# Patient Record
Sex: FEMALE | Race: WHITE | Employment: OTHER
[De-identification: names, ages, dates, MRNs, and addresses within clinical notes are randomized per-mention and may not be internally consistent; named-entity substitution may affect disease eponyms.]

---

## 2017-01-10 DIAGNOSIS — S63.509A: Primary | ICD-10-CM

## 2017-01-10 DIAGNOSIS — M12.539: ICD-10-CM

## 2017-01-12 ENCOUNTER — OFFICE VISIT (OUTPATIENT)
Dept: PAIN MANAGEMENT | Facility: CLINIC | Age: 67
End: 2017-01-12

## 2017-01-12 VITALS
RESPIRATION RATE: 16 BRPM | HEART RATE: 78 BPM | DIASTOLIC BLOOD PRESSURE: 71 MMHG | SYSTOLIC BLOOD PRESSURE: 145 MMHG | WEIGHT: 148 LBS | HEIGHT: 64 IN | BODY MASS INDEX: 25.27 KG/M2 | TEMPERATURE: 97 F

## 2017-01-12 DIAGNOSIS — M12.532 TRAUMATIC ARTHROPATHY OF LEFT WRIST: ICD-10-CM

## 2017-01-12 DIAGNOSIS — S63.502A SPRAIN OF LEFT FOREARM, INITIAL ENCOUNTER: ICD-10-CM

## 2017-01-12 DIAGNOSIS — S63.509A: ICD-10-CM

## 2017-01-12 DIAGNOSIS — S63.502A SPRAIN OF WRIST, LEFT, INITIAL ENCOUNTER: ICD-10-CM

## 2017-01-12 DIAGNOSIS — S63.501A SPRAIN OF WRIST, RIGHT, INITIAL ENCOUNTER: ICD-10-CM

## 2017-01-12 DIAGNOSIS — G89.29 OTHER CHRONIC PAIN: ICD-10-CM

## 2017-01-12 DIAGNOSIS — M12.539: ICD-10-CM

## 2017-01-12 PROCEDURE — 99214 OFFICE O/P EST MOD 30 MIN: CPT | Performed by: PHYSICAL MEDICINE & REHABILITATION

## 2017-01-12 RX ORDER — BUPRENORPHINE 7.5 UG/H
7.5 PATCH TRANSDERMAL
Qty: 4 PATCH | Refills: 2 | Status: SHIPPED | OUTPATIENT
Start: 2017-01-12 | End: 2017-03-30 | Stop reason: SDUPTHER

## 2017-03-30 ENCOUNTER — TELEPHONE (OUTPATIENT)
Dept: PAIN MANAGEMENT | Age: 67
End: 2017-03-30

## 2017-03-30 DIAGNOSIS — G89.29 OTHER CHRONIC PAIN: ICD-10-CM

## 2017-03-30 DIAGNOSIS — S63.501A SPRAIN OF WRIST, RIGHT, INITIAL ENCOUNTER: ICD-10-CM

## 2017-03-30 DIAGNOSIS — M12.532 TRAUMATIC ARTHROPATHY OF LEFT WRIST: ICD-10-CM

## 2017-03-30 DIAGNOSIS — S63.502A SPRAIN OF WRIST, LEFT, INITIAL ENCOUNTER: ICD-10-CM

## 2017-03-30 DIAGNOSIS — S63.502A SPRAIN OF LEFT FOREARM, INITIAL ENCOUNTER: ICD-10-CM

## 2017-03-30 DIAGNOSIS — M12.539: ICD-10-CM

## 2017-03-30 DIAGNOSIS — S63.509A: ICD-10-CM

## 2017-03-30 RX ORDER — BUPRENORPHINE 7.5 UG/H
7.5 PATCH TRANSDERMAL
Qty: 4 PATCH | Refills: 2 | Status: SHIPPED | OUTPATIENT
Start: 2017-03-30 | End: 2019-01-07

## 2017-05-18 ENCOUNTER — OFFICE VISIT (OUTPATIENT)
Dept: PAIN MANAGEMENT | Age: 67
End: 2017-05-18
Payer: COMMERCIAL

## 2017-05-18 VITALS
HEART RATE: 75 BPM | DIASTOLIC BLOOD PRESSURE: 57 MMHG | RESPIRATION RATE: 18 BRPM | WEIGHT: 156 LBS | HEIGHT: 63 IN | SYSTOLIC BLOOD PRESSURE: 144 MMHG | BODY MASS INDEX: 27.64 KG/M2 | TEMPERATURE: 98.2 F

## 2017-05-18 DIAGNOSIS — S63.501A SPRAIN OF WRIST, RIGHT, INITIAL ENCOUNTER: ICD-10-CM

## 2017-05-18 DIAGNOSIS — E78.49 OTHER HYPERLIPIDEMIA: ICD-10-CM

## 2017-05-18 DIAGNOSIS — I10 ESSENTIAL HYPERTENSION: ICD-10-CM

## 2017-05-18 DIAGNOSIS — S63.502A SPRAIN OF LEFT FOREARM, INITIAL ENCOUNTER: ICD-10-CM

## 2017-05-18 DIAGNOSIS — G89.29 OTHER CHRONIC PAIN: Primary | ICD-10-CM

## 2017-05-18 DIAGNOSIS — M12.532 TRAUMATIC ARTHROPATHY OF LEFT WRIST: ICD-10-CM

## 2017-05-18 DIAGNOSIS — M12.539: ICD-10-CM

## 2017-05-18 DIAGNOSIS — S63.509A: ICD-10-CM

## 2017-05-18 DIAGNOSIS — S63.502A SPRAIN OF WRIST, LEFT, INITIAL ENCOUNTER: ICD-10-CM

## 2017-05-18 PROBLEM — E78.5 HYPERLIPIDEMIA: Status: ACTIVE | Noted: 2017-05-18

## 2017-05-18 PROCEDURE — 99214 OFFICE O/P EST MOD 30 MIN: CPT | Performed by: PHYSICAL MEDICINE & REHABILITATION

## 2017-05-18 PROCEDURE — G8400 PT W/DXA NO RESULTS DOC: HCPCS | Performed by: PHYSICAL MEDICINE & REHABILITATION

## 2017-05-18 PROCEDURE — G8420 CALC BMI NORM PARAMETERS: HCPCS | Performed by: PHYSICAL MEDICINE & REHABILITATION

## 2017-05-18 PROCEDURE — 3017F COLORECTAL CA SCREEN DOC REV: CPT | Performed by: PHYSICAL MEDICINE & REHABILITATION

## 2017-05-18 PROCEDURE — 1036F TOBACCO NON-USER: CPT | Performed by: PHYSICAL MEDICINE & REHABILITATION

## 2017-05-18 PROCEDURE — 1123F ACP DISCUSS/DSCN MKR DOCD: CPT | Performed by: PHYSICAL MEDICINE & REHABILITATION

## 2017-05-18 PROCEDURE — G8427 DOCREV CUR MEDS BY ELIG CLIN: HCPCS | Performed by: PHYSICAL MEDICINE & REHABILITATION

## 2017-05-18 PROCEDURE — 4040F PNEUMOC VAC/ADMIN/RCVD: CPT | Performed by: PHYSICAL MEDICINE & REHABILITATION

## 2017-05-18 PROCEDURE — 1090F PRES/ABSN URINE INCON ASSESS: CPT | Performed by: PHYSICAL MEDICINE & REHABILITATION

## 2017-05-18 PROCEDURE — 3014F SCREEN MAMMO DOC REV: CPT | Performed by: PHYSICAL MEDICINE & REHABILITATION

## 2017-05-18 RX ORDER — BUPRENORPHINE 7.5 UG/H
1 PATCH TRANSDERMAL
Qty: 4 PATCH | Refills: 2 | Status: SHIPPED | OUTPATIENT
Start: 2017-05-18 | End: 2017-08-24 | Stop reason: SDUPTHER

## 2017-05-23 PROBLEM — E11.9 TYPE 2 DIABETES MELLITUS WITHOUT COMPLICATION, WITHOUT LONG-TERM CURRENT USE OF INSULIN (HCC): Status: ACTIVE | Noted: 2017-05-23

## 2017-08-24 ENCOUNTER — OFFICE VISIT (OUTPATIENT)
Dept: PAIN MANAGEMENT | Age: 67
End: 2017-08-24
Payer: MEDICARE

## 2017-08-24 VITALS
TEMPERATURE: 97.5 F | HEART RATE: 71 BPM | DIASTOLIC BLOOD PRESSURE: 70 MMHG | RESPIRATION RATE: 20 BRPM | SYSTOLIC BLOOD PRESSURE: 151 MMHG | WEIGHT: 156 LBS | HEIGHT: 64 IN | BODY MASS INDEX: 26.63 KG/M2

## 2017-08-24 DIAGNOSIS — M12.532 TRAUMATIC ARTHROPATHY OF LEFT WRIST: ICD-10-CM

## 2017-08-24 DIAGNOSIS — S63.502A SPRAIN OF WRIST, LEFT, INITIAL ENCOUNTER: ICD-10-CM

## 2017-08-24 DIAGNOSIS — S63.501A SPRAIN OF WRIST, RIGHT, INITIAL ENCOUNTER: ICD-10-CM

## 2017-08-24 DIAGNOSIS — S63.502A SPRAIN OF LEFT FOREARM, INITIAL ENCOUNTER: ICD-10-CM

## 2017-08-24 DIAGNOSIS — S63.509A: ICD-10-CM

## 2017-08-24 DIAGNOSIS — I10 ESSENTIAL HYPERTENSION: ICD-10-CM

## 2017-08-24 DIAGNOSIS — G89.29 OTHER CHRONIC PAIN: ICD-10-CM

## 2017-08-24 DIAGNOSIS — M12.539: ICD-10-CM

## 2017-08-24 PROCEDURE — 3014F SCREEN MAMMO DOC REV: CPT | Performed by: PHYSICAL MEDICINE & REHABILITATION

## 2017-08-24 PROCEDURE — G8400 PT W/DXA NO RESULTS DOC: HCPCS | Performed by: PHYSICAL MEDICINE & REHABILITATION

## 2017-08-24 PROCEDURE — G8419 CALC BMI OUT NRM PARAM NOF/U: HCPCS | Performed by: PHYSICAL MEDICINE & REHABILITATION

## 2017-08-24 PROCEDURE — 3017F COLORECTAL CA SCREEN DOC REV: CPT | Performed by: PHYSICAL MEDICINE & REHABILITATION

## 2017-08-24 PROCEDURE — 4040F PNEUMOC VAC/ADMIN/RCVD: CPT | Performed by: PHYSICAL MEDICINE & REHABILITATION

## 2017-08-24 PROCEDURE — 1123F ACP DISCUSS/DSCN MKR DOCD: CPT | Performed by: PHYSICAL MEDICINE & REHABILITATION

## 2017-08-24 PROCEDURE — 1036F TOBACCO NON-USER: CPT | Performed by: PHYSICAL MEDICINE & REHABILITATION

## 2017-08-24 PROCEDURE — 1090F PRES/ABSN URINE INCON ASSESS: CPT | Performed by: PHYSICAL MEDICINE & REHABILITATION

## 2017-08-24 PROCEDURE — 99214 OFFICE O/P EST MOD 30 MIN: CPT | Performed by: PHYSICAL MEDICINE & REHABILITATION

## 2017-08-24 PROCEDURE — G8427 DOCREV CUR MEDS BY ELIG CLIN: HCPCS | Performed by: PHYSICAL MEDICINE & REHABILITATION

## 2017-08-24 RX ORDER — BUPRENORPHINE 7.5 UG/H
1 PATCH TRANSDERMAL
Qty: 4 PATCH | Refills: 2 | Status: SHIPPED | OUTPATIENT
Start: 2017-08-24 | End: 2017-11-02 | Stop reason: SDUPTHER

## 2017-08-24 RX ORDER — BUPRENORPHINE 7.5 UG/H
1 PATCH TRANSDERMAL
Qty: 4 PATCH | Refills: 2 | Status: SHIPPED | OUTPATIENT
Start: 2017-08-24 | End: 2017-08-24 | Stop reason: SDUPTHER

## 2017-10-12 ENCOUNTER — HOSPITAL ENCOUNTER (OUTPATIENT)
Dept: WOMENS IMAGING | Age: 67
Discharge: HOME | End: 2017-10-12
Payer: MEDICARE

## 2017-10-12 DIAGNOSIS — Z12.31 ENCOUNTER FOR SCREENING MAMMOGRAM FOR BREAST CANCER: ICD-10-CM

## 2017-10-12 PROCEDURE — G0202 SCR MAMMO BI INCL CAD: HCPCS

## 2017-11-02 ENCOUNTER — OFFICE VISIT (OUTPATIENT)
Dept: PAIN MANAGEMENT | Age: 67
End: 2017-11-02
Payer: COMMERCIAL

## 2017-11-02 VITALS
BODY MASS INDEX: 27.46 KG/M2 | HEIGHT: 63 IN | WEIGHT: 155 LBS | SYSTOLIC BLOOD PRESSURE: 136 MMHG | RESPIRATION RATE: 18 BRPM | HEART RATE: 77 BPM | DIASTOLIC BLOOD PRESSURE: 68 MMHG | TEMPERATURE: 97 F

## 2017-11-02 DIAGNOSIS — G89.21 CHRONIC PAIN DUE TO TRAUMA: ICD-10-CM

## 2017-11-02 DIAGNOSIS — G89.29 OTHER CHRONIC PAIN: Primary | ICD-10-CM

## 2017-11-02 DIAGNOSIS — S63.502A SPRAIN OF WRIST, LEFT, INITIAL ENCOUNTER: ICD-10-CM

## 2017-11-02 DIAGNOSIS — M12.532 TRAUMATIC ARTHROPATHY OF LEFT WRIST: ICD-10-CM

## 2017-11-02 DIAGNOSIS — S63.502A SPRAIN OF LEFT FOREARM, INITIAL ENCOUNTER: ICD-10-CM

## 2017-11-02 DIAGNOSIS — S63.501A SPRAIN OF WRIST, RIGHT, INITIAL ENCOUNTER: ICD-10-CM

## 2017-11-02 DIAGNOSIS — I10 ESSENTIAL HYPERTENSION: ICD-10-CM

## 2017-11-02 PROCEDURE — 1036F TOBACCO NON-USER: CPT | Performed by: PHYSICAL MEDICINE & REHABILITATION

## 2017-11-02 PROCEDURE — 3014F SCREEN MAMMO DOC REV: CPT | Performed by: PHYSICAL MEDICINE & REHABILITATION

## 2017-11-02 PROCEDURE — 1090F PRES/ABSN URINE INCON ASSESS: CPT | Performed by: PHYSICAL MEDICINE & REHABILITATION

## 2017-11-02 PROCEDURE — G8400 PT W/DXA NO RESULTS DOC: HCPCS | Performed by: PHYSICAL MEDICINE & REHABILITATION

## 2017-11-02 PROCEDURE — 1123F ACP DISCUSS/DSCN MKR DOCD: CPT | Performed by: PHYSICAL MEDICINE & REHABILITATION

## 2017-11-02 PROCEDURE — 99214 OFFICE O/P EST MOD 30 MIN: CPT | Performed by: PHYSICAL MEDICINE & REHABILITATION

## 2017-11-02 PROCEDURE — 4040F PNEUMOC VAC/ADMIN/RCVD: CPT | Performed by: PHYSICAL MEDICINE & REHABILITATION

## 2017-11-02 PROCEDURE — G8417 CALC BMI ABV UP PARAM F/U: HCPCS | Performed by: PHYSICAL MEDICINE & REHABILITATION

## 2017-11-02 PROCEDURE — G8484 FLU IMMUNIZE NO ADMIN: HCPCS | Performed by: PHYSICAL MEDICINE & REHABILITATION

## 2017-11-02 PROCEDURE — 3017F COLORECTAL CA SCREEN DOC REV: CPT | Performed by: PHYSICAL MEDICINE & REHABILITATION

## 2017-11-02 PROCEDURE — G8427 DOCREV CUR MEDS BY ELIG CLIN: HCPCS | Performed by: PHYSICAL MEDICINE & REHABILITATION

## 2017-11-02 RX ORDER — BUPRENORPHINE 7.5 UG/H
1 PATCH TRANSDERMAL
Qty: 4 PATCH | Refills: 2 | Status: SHIPPED | OUTPATIENT
Start: 2017-11-16 | End: 2018-02-01 | Stop reason: SDUPTHER

## 2017-11-02 NOTE — PATIENT INSTRUCTIONS
SURVEY:    You may be receiving a survey from [employer] regarding your visit today. Please complete the survey to enable us to provide the highest quality of care to you and your family. If you cannot score us a very good on any question, please call the office to discuss how we could of made your experience a very good one. Thank you. Reviewed medication safety with pt today. 1.  Use one pharmacy and notify our office if a change in pharmacy occurs. 2.  Only one physician is to prescribe pain medication. 3.  Take medication as prescribed. Do NOT increase on your own. 4.  Do not take medication that does not belong to you. 5.  Lost or stolen medication will not be replaced. 6.  Refrain from using alcohol while taking narcotic pain medication. If alcohol is positive in urine a warning will be issued and second offense may    Result in discharge from our practice. Contact information MUST be kept current. Prior authorizations for medication can take 24 to 72 business hours. Refill requests should be made a week in advance if needed. It is the patient's responsibility to notify our office of refill requests NOT the pharmacy. Remember that refills should be addressed at your visit to prevent delays in your refill.

## 2017-11-02 NOTE — PROGRESS NOTES
Subjective:      Patient ID: Marielena Allen is a 69 y.o. female. Visit Information    Have you changed or started any medications since your last visit including any over-the-counter medicines, vitamins, or herbal medicines? no     Are you having any side effects from any of your medications? -  no    Any difficulty with constipation? No    Do you feel rested upon wakening? No      Have you stopped taking any of your medications or changed how you are taking your medication? Is so, why? -  no    What are you able to do with the current treatment plan that you were not able to do prior to treatment? Rest easier, decreases pain    Have you seen any other physician or provider since your last visit? Have you had any other diagnostic tests since your last visit? Have you been seen in the emergency room and/or had an admission to a hospital since we last saw you? No    Did you receive pain medication from another provider? No    Are you avoiding alcohol? Yes    Have you activated your [employer] account? If not, what are your barriers? Yes    HPI  Marielena Allen feels symptoms of left wrist pain are persistent. Patient has new complaint today of generalized aches. Pt fell on ice and has discomfort in left foot and leg. Patient feels home exercise program has helped them to function better with ADL's. Patient feels medicine has helped them function better with ADL's. Patient reports their average pain score is 6-7/10  with medication and at worst 9/10 with out pain medication. Patient filled out the Oswestry Low Back Pain Disability questionnaire and scored  9-10 / 48.  0 being not disabled and 48 being totally disabled. This score is with the current treatment plan. Patient feels like our current treatment program is helping over all. Marielena Allen states they do not have changes to there Family History since the last visit.    Marielena Allen states they do not have changes to there Social History since the last visit. Elgin Valladares is not currently working. Pt would like to discuss: Patient needs her refill      Review of Systems   Constitutional: Positive for fatigue. Negative for chills, activity change and appetite change. HENT: Positive for neck stiffness. Negative for headaches. Respiratory: Negative for apnea, and wheezing. Positive for shortness of breath with activity. Cardiovascular: Negative for leg swelling. Gastrointestinal: . Negative for diarrhea. Negative for constipation. Genitourinary: Negative for urgency, frequency and difficulty urinating. Musculoskeletal: Positive for arthralgias. Negative for joint swelling and gait problem. Positive for back stiffness. Skin: Negative for color change and pallor. Neurological: Negative for tremors. Sensitivity in tip of finger. Hematological: Negative for adenopathy. Does not bruise/bleed easily. Psychiatric/Behavioral:  Negative for confusion, disturbed wake/sleep cycle and decreased concentration. The patient is nervous/anxious. Positive for sleep disturbance. Objective:   Physical Exam   Constitutional:         /68   Pulse 77   Temp 97 °F (36.1 °C) (Tympanic)   Resp 18   Ht 5' 3\" (1.6 m)   Wt 155 lb (70.3 kg)   BMI 27.46 kg/m²     Constitutional:  General appearance well developed, well nourished. Eyes:  Sclera white. Mouth:  Tongue is pink, oral mucosa wet. Ears:  Normal color and no discharge. Nose:  No discharge, normal mucosa. Throat: Normal voice. Neck:  No tracheal deviation present. No thyromegaly. Skin:  Within normal limits on inspection. Lung: No audible wheezing or SOB. Neurological:  Patient is oriented to person, place, and time. Attention appears to be normal. Memory appears to be normal and language also appears to be normal. Cranial nerves appear to be grossly intact on the right and left without any gross abnormalities. Psychiatric:  Patient is alert and show normal insight.  Patient has normal affect. Musculoskeletal:  Gait appears to be normal with alternating arms and legs. No edema noted. No tenderness. Assessment:       ICD-10-CM ICD-9-CM    1. Other chronic pain G89.29 338.29 diclofenac sodium 1 % GEL      buprenorphine (BUTRANS) 7.5 MCG/HR PTWK   2. 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 Sprain of wrist, right, initial encounter S63.501A 842.00 diclofenac sodium 1 % GEL      buprenorphine (BUTRANS) 7.5 MCG/HR PTWK   5. Sprain of wrist, left, initial encounter S63.502A 842.00 diclofenac sodium 1 % GEL      buprenorphine (BUTRANS) 7.5 MCG/HR PTWK   6. Sprain of left forearm, initial encounter S63.502A 841.9 diclofenac sodium 1 % GEL      buprenorphine (BUTRANS) 7.5 MCG/HR PTWK   7. Essential hypertension I10 401.9 diclofenac sodium 1 % GEL      buprenorphine (BUTRANS) 7.5 MCG/HR PTWK         Plan:     In my professional opinion I believe that the Lucille Feliz should continue their current home exercise program which is both medically appropriate and necessary to reduce pain and increase functioning. In my professional opinion I believe that Lucille Feliz should continue their current medications as shown in the current medication section. Because it's reasonably related to the allowed conditions and are medically necessary and appropriate for treatment and control of associated symptoms.     I would also like Lucille Feliz to discontinue the following medications:    Discontinued Medications    No medications on file       I would like Lucille Feliz to use the following medications:    Modified Medications    Modified Medication Previous Medication    BUPRENORPHINE (BUTRANS) 7.5 MCG/HR PTWK buprenorphine (BUTRANS) 7.5 MCG/HR PTWK       Place 1 patch onto the skin every 7 days    Place 1 patch onto the skin every

## 2017-11-15 ENCOUNTER — HOSPITAL ENCOUNTER (OUTPATIENT)
Age: 67
Discharge: HOME | End: 2017-11-15
Payer: MEDICARE

## 2017-11-15 DIAGNOSIS — E11.9 TYPE 2 DIABETES MELLITUS WITHOUT COMPLICATION, WITHOUT LONG-TERM CURRENT USE OF INSULIN (HCC): ICD-10-CM

## 2017-11-15 LAB
ESTIMATED AVERAGE GLUCOSE: 131 MG/DL
HBA1C MFR BLD: 6.2 % (ref 4.8–5.9)

## 2017-11-15 PROCEDURE — 36415 COLL VENOUS BLD VENIPUNCTURE: CPT

## 2017-11-15 PROCEDURE — 83036 HEMOGLOBIN GLYCOSYLATED A1C: CPT

## 2018-01-30 DIAGNOSIS — M12.532 TRAUMATIC ARTHROPATHY OF LEFT WRIST: Primary | ICD-10-CM

## 2018-02-01 ENCOUNTER — OFFICE VISIT (OUTPATIENT)
Dept: PAIN MANAGEMENT | Age: 68
End: 2018-02-01
Payer: COMMERCIAL

## 2018-02-01 ENCOUNTER — HOSPITAL ENCOUNTER (OUTPATIENT)
Dept: LAB | Age: 68
Setting detail: SPECIMEN
Discharge: HOME | End: 2018-02-01
Payer: COMMERCIAL

## 2018-02-01 VITALS
TEMPERATURE: 98.2 F | DIASTOLIC BLOOD PRESSURE: 77 MMHG | HEART RATE: 75 BPM | BODY MASS INDEX: 27.28 KG/M2 | SYSTOLIC BLOOD PRESSURE: 136 MMHG | WEIGHT: 154 LBS

## 2018-02-01 DIAGNOSIS — S63.501A SPRAIN OF WRIST, RIGHT, INITIAL ENCOUNTER: ICD-10-CM

## 2018-02-01 DIAGNOSIS — S63.502A SPRAIN OF WRIST, LEFT, INITIAL ENCOUNTER: ICD-10-CM

## 2018-02-01 DIAGNOSIS — S63.502A SPRAIN OF LEFT FOREARM, INITIAL ENCOUNTER: ICD-10-CM

## 2018-02-01 DIAGNOSIS — M12.532 TRAUMATIC ARTHROPATHY OF LEFT WRIST: ICD-10-CM

## 2018-02-01 DIAGNOSIS — G89.29 OTHER CHRONIC PAIN: Primary | ICD-10-CM

## 2018-02-01 DIAGNOSIS — I10 ESSENTIAL HYPERTENSION: ICD-10-CM

## 2018-02-01 DIAGNOSIS — G89.21 CHRONIC PAIN DUE TO TRAUMA: ICD-10-CM

## 2018-02-01 PROCEDURE — 80307 DRUG TEST PRSMV CHEM ANLYZR: CPT

## 2018-02-01 PROCEDURE — 99214 OFFICE O/P EST MOD 30 MIN: CPT | Performed by: PHYSICAL MEDICINE & REHABILITATION

## 2018-02-01 RX ORDER — BUPRENORPHINE 7.5 UG/H
1 PATCH TRANSDERMAL
Qty: 4 PATCH | Refills: 2 | Status: SHIPPED | OUTPATIENT
Start: 2018-02-01 | End: 2019-01-07

## 2018-02-01 NOTE — PATIENT INSTRUCTIONS
Regarding your billing: This is a provider based clinic. Therefore you will receive 2 bills. One from the hospital billing service and one from [facility] - the physicians billing service. Reviewed medication safety with pt today. 1.  Use one pharmacy and notify our office if a change in pharmacy occurs. 2.  Only one physician is to prescribe pain medication. 3.  Take medication as prescribed. Do NOT increase on your own. 4.  Do not take medication that does not belong to you. 5.  Lost or stolen medication will not be replaced. 6.  Refrain from using alcohol while taking narcotic pain medication. If alcohol is positive in urine a warning will be issued and second offense may    Result in discharge from our practice. Contact information MUST be kept current. Prior authorizations for medication can take 24 to 72 business hours. Refill requests should be made a week in advance if needed. It is the patient's responsibility to notify our office of refill requests NOT the pharmacy. Remember that refills should be addressed at your visit to prevent delays in your refill. SURVEY:    You may be receiving a survey from [email or web address] regarding your visit today. Please complete the survey to enable us to provide the highest quality of care to you and your family. If you cannot score us a very good on any question, please call the office to discuss how we could of made your experience a very good one. Thank you.

## 2018-02-01 NOTE — PROGRESS NOTES
% GEL      buprenorphine (BUTRANS) 7.5 MCG/HR PTWK   2. 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 Sprain of wrist, right, initial encounter S63.501A 842.00 diclofenac sodium 1 % GEL      buprenorphine (BUTRANS) 7.5 MCG/HR PTWK   5. Sprain of wrist, left, initial encounter S63.502A 842.00 diclofenac sodium 1 % GEL      buprenorphine (BUTRANS) 7.5 MCG/HR PTWK   6. Sprain of left forearm, initial encounter S63.502A 841.9 diclofenac sodium 1 % GEL      buprenorphine (BUTRANS) 7.5 MCG/HR PTWK   7. Essential hypertension I10 401.9 diclofenac sodium 1 % GEL      buprenorphine (BUTRANS) 7.5 MCG/HR PTWK         Plan:     In my professional opinion I believe that the Jennifer Mew should continue their current home exercise program which is both medically appropriate and necessary to reduce pain and increase functioning. In my professional opinion I believe that sis Mew should continue their current medications as shown in the current medication section. Because it's reasonably related to the allowed conditions and are medically necessary and appropriate for treatment and control of associated symptoms. [facility] also [facility] following medications:    Discontinued Medications    No medications on file       I would like Jennifer Mew to use the following medications:    Modified Medications    Modified Medication Previous Medication    BUPRENORPHINE (BUTRANS) 7.5 MCG/HR PTWK buprenorphine (BUTRANS) 7.5 MCG/HR PTWK       Place 1 patch onto the skin every 7 days for 84 days.     Place 1 patch onto the skin every 7 days    DICLOFENAC SODIUM 1 % GEL diclofenac sodium 1 % GEL       Apply 4 g topically 4 times daily as needed (inflammation)    Apply 4 g topically 4 times daily as needed (inflammation)     I would like Jennifer Mew to use the following medications:    New Prescriptions    No medications on file     I discussed with the Saad Del Real about the management of controlled medications being prescribed in the current medication section. I instructed the patient on random urine tox screens, pill counts and OARRS reporting. Also instructed on letting us know if any other physicians are writing medications for them. As well as use of only one pharmacy. Controlled Substances Monitoring:     Attestation: The Prescription Monitoring Report for this patient was reviewed today. Faraz Cunningham MD)  Documentation: No signs of potential drug abuse or diversion identified. Faraz Cunningham MD)  Chronic Pain: Treatment objectives documented - patient is progressing appropriately. , Functional status reviewed - continues with improved or maintaining ADL's., Reviewed the patient's functional status and documentation, including the 4A's of chronic pain treatment. Faraz Cunningham MD)  Medication Contracts: Existing medication contract. Faraz Cunningham MD)      I discussed with the patient side effects and danger of prescribed medicine. I will follow up with the as noted on the discharge sheet. I Dr Barbara Becerra will be retiring from [facility] in March of 2018 and Saad Del Real  will need to follow up with you. They have been doing well on the current medication as seen above. I hope you will be able to take care of there pain management needs.

## 2018-02-06 ENCOUNTER — HOSPITAL ENCOUNTER (EMERGENCY)
Age: 68
Discharge: HOME | End: 2018-02-06
Attending: EMERGENCY MEDICINE
Payer: MEDICARE

## 2018-02-06 VITALS
HEIGHT: 63 IN | BODY MASS INDEX: 27.46 KG/M2 | HEART RATE: 72 BPM | TEMPERATURE: 95.9 F | OXYGEN SATURATION: 98 % | SYSTOLIC BLOOD PRESSURE: 120 MMHG | DIASTOLIC BLOOD PRESSURE: 64 MMHG | RESPIRATION RATE: 16 BRPM | WEIGHT: 155 LBS

## 2018-02-06 DIAGNOSIS — R42 VERTIGO: Primary | ICD-10-CM

## 2018-02-06 LAB
6-ACETYLMORPHINE, UR: NOT DETECTED
7-AMINOCLONAZEPAM, URINE: NOT DETECTED
ABSOLUTE EOS #: 0.1 K/UL (ref 0–0.4)
ABSOLUTE IMMATURE GRANULOCYTE: ABNORMAL K/UL (ref 0–0.3)
ABSOLUTE LYMPH #: 2.2 K/UL (ref 1–4.8)
ABSOLUTE MONO #: 0.7 K/UL (ref 0.2–0.8)
ALPHA-OH-ALPRAZ, URINE: NOT DETECTED
ALPRAZOLAM, URINE: NOT DETECTED
AMPHETAMINES, URINE: NOT DETECTED
ANION GAP SERPL CALCULATED.3IONS-SCNC: 12 MMOL/L (ref 9–17)
BARBITURATES, URINE: NOT DETECTED
BASOPHILS # BLD: 1 % (ref 0–2)
BASOPHILS ABSOLUTE: 0 K/UL (ref 0–0.2)
BENZOYLECGONINE, UR: NOT DETECTED
BUN BLDV-MCNC: 20 MG/DL (ref 8–23)
BUN/CREAT BLD: 24 (ref 9–20)
BUPRENORPHINE URINE: NOT DETECTED
CALCIUM SERPL-MCNC: 8.7 MG/DL (ref 8.6–10.4)
CARISOPRODOL, UR: NOT DETECTED
CHLORIDE BLD-SCNC: 99 MMOL/L (ref 98–107)
CLONAZEPAM, URINE: NOT DETECTED
CO2: 30 MMOL/L (ref 20–31)
CODEINE, URINE: NOT DETECTED
CREAT SERPL-MCNC: 0.83 MG/DL (ref 0.5–0.9)
CREATININE URINE: 98.1 MG/DL (ref 20–400)
DIAZEPAM, URINE: NOT DETECTED
DIFFERENTIAL TYPE: ABNORMAL
DIRECT EXAM: NORMAL
DRUGS EXPECTED, UR: NORMAL
EER HI RES INTERP UR: NORMAL
EKG ATRIAL RATE: 67 BPM
EKG P AXIS: 19 DEGREES
EKG P-R INTERVAL: 186 MS
EKG Q-T INTERVAL: 422 MS
EKG QRS DURATION: 100 MS
EKG QTC CALCULATION (BAZETT): 445 MS
EKG R AXIS: -34 DEGREES
EKG T AXIS: 24 DEGREES
EKG VENTRICULAR RATE: 67 BPM
EOSINOPHILS RELATIVE PERCENT: 2 % (ref 1–4)
ETHYL GLUCURONIDE UR: NOT DETECTED
FENTANYL URINE: NOT DETECTED
GFR AFRICAN AMERICAN: >60 ML/MIN
GFR NON-AFRICAN AMERICAN: >60 ML/MIN
GFR SERPL CREATININE-BSD FRML MDRD: ABNORMAL ML/MIN/{1.73_M2}
GFR SERPL CREATININE-BSD FRML MDRD: ABNORMAL ML/MIN/{1.73_M2}
GLUCOSE BLD-MCNC: 146 MG/DL (ref 70–99)
HCT VFR BLD CALC: 37.4 % (ref 36–46)
HEMOGLOBIN: 12.9 G/DL (ref 12–16)
HYDROCODONE, URINE: NOT DETECTED
HYDROMORPHONE, URINE: NOT DETECTED
IMMATURE GRANULOCYTES: ABNORMAL %
LORAZEPAM, URINE: NOT DETECTED
LYMPHOCYTES # BLD: 38 % (ref 24–44)
Lab: NORMAL
MARIJUANA METAB, UR: NOT DETECTED
MCH RBC QN AUTO: 31.1 PG (ref 26–34)
MCHC RBC AUTO-ENTMCNC: 34.4 G/DL (ref 31–37)
MCV RBC AUTO: 90.3 FL (ref 80–100)
MDA, UR: NOT DETECTED
MDEA, EVE, UR: NOT DETECTED
MDMA URINE: NOT DETECTED
MEPERIDINE METAB, UR: NOT DETECTED
METHADONE, URINE: NOT DETECTED
METHAMPHETAMINE, URINE: NOT DETECTED
METHYLPHENIDATE: NOT DETECTED
MIDAZOLAM, URINE: NOT DETECTED
MONOCYTES # BLD: 12 % (ref 1–7)
MORPHINE URINE: NOT DETECTED
NORBUPRENORPHINE, URINE: NOT DETECTED
NORDIAZEPAM, URINE: NOT DETECTED
NORFENTANYL, URINE: NOT DETECTED
NORHYDROCODONE, URINE: NOT DETECTED
NOROXYCODONE, URINE: NOT DETECTED
NOROXYMORPHONE, URINE: NOT DETECTED
NRBC AUTOMATED: ABNORMAL PER 100 WBC
OXAZEPAM, URINE: NOT DETECTED
OXYCODONE URINE: NOT DETECTED
OXYMORPHONE, URINE: NOT DETECTED
PAIN MANAGEMENT DRUG PANEL INTERP, URINE: NORMAL
PAIN MGT DRUG PANEL, HI RES, UR: NORMAL
PCP,URINE: NOT DETECTED
PDW BLD-RTO: 12.2 % (ref 12.1–15.2)
PHENTERMINE, UR: NOT DETECTED
PLATELET # BLD: 273 K/UL (ref 140–450)
PLATELET ESTIMATE: ABNORMAL
PMV BLD AUTO: 8.1 FL (ref 6–12)
POTASSIUM SERPL-SCNC: 3.2 MMOL/L (ref 3.7–5.3)
PROPOXYPHENE, URINE: NOT DETECTED
RBC # BLD: 4.15 M/UL (ref 4–5.2)
RBC # BLD: ABNORMAL 10*6/UL
SEG NEUTROPHILS: 47 % (ref 36–66)
SEGMENTED NEUTROPHILS ABSOLUTE COUNT: 2.9 K/UL (ref 1.8–7.7)
SODIUM BLD-SCNC: 141 MMOL/L (ref 135–144)
SPECIMEN DESCRIPTION: NORMAL
STATUS: NORMAL
TAPENTADOL, URINE: NOT DETECTED
TAPENTADOL-O-SULFATE, URINE: NOT DETECTED
TEMAZEPAM, URINE: NOT DETECTED
TRAMADOL, URINE: NOT DETECTED
WBC # BLD: 5.9 K/UL (ref 3.5–11)
WBC # BLD: ABNORMAL 10*3/UL
ZOLPIDEM, URINE: NOT DETECTED

## 2018-02-06 PROCEDURE — 87804 INFLUENZA ASSAY W/OPTIC: CPT

## 2018-02-06 PROCEDURE — 85025 COMPLETE CBC W/AUTO DIFF WBC: CPT

## 2018-02-06 PROCEDURE — 80048 BASIC METABOLIC PNL TOTAL CA: CPT

## 2018-02-06 PROCEDURE — 6360000002 HC RX W HCPCS: Performed by: EMERGENCY MEDICINE

## 2018-02-06 PROCEDURE — 36415 COLL VENOUS BLD VENIPUNCTURE: CPT

## 2018-02-06 PROCEDURE — 2580000003 HC RX 258: Performed by: EMERGENCY MEDICINE

## 2018-02-06 PROCEDURE — 99284 EMERGENCY DEPT VISIT MOD MDM: CPT

## 2018-02-06 PROCEDURE — 93005 ELECTROCARDIOGRAM TRACING: CPT

## 2018-02-06 PROCEDURE — 96374 THER/PROPH/DIAG INJ IV PUSH: CPT

## 2018-02-06 RX ORDER — 0.9 % SODIUM CHLORIDE 0.9 %
500 INTRAVENOUS SOLUTION INTRAVENOUS ONCE
Status: COMPLETED | OUTPATIENT
Start: 2018-02-06 | End: 2018-02-06

## 2018-02-06 RX ORDER — ONDANSETRON 4 MG/1
4 TABLET, ORALLY DISINTEGRATING ORAL EVERY 8 HOURS PRN
Qty: 10 TABLET | Refills: 0 | Status: SHIPPED | OUTPATIENT
Start: 2018-02-06 | End: 2018-02-20

## 2018-02-06 RX ORDER — ONDANSETRON 2 MG/ML
4 INJECTION INTRAMUSCULAR; INTRAVENOUS ONCE
Status: COMPLETED | OUTPATIENT
Start: 2018-02-06 | End: 2018-02-06

## 2018-02-06 RX ADMIN — SODIUM CHLORIDE 500 ML: 9 INJECTION, SOLUTION INTRAVENOUS at 05:05

## 2018-02-06 RX ADMIN — ONDANSETRON 4 MG: 2 INJECTION INTRAMUSCULAR; INTRAVENOUS at 05:06

## 2018-02-06 ASSESSMENT — PAIN DESCRIPTION - PAIN TYPE: TYPE: ACUTE PAIN

## 2018-02-06 ASSESSMENT — PAIN SCALES - GENERAL: PAINLEVEL_OUTOF10: 9

## 2018-02-06 ASSESSMENT — PAIN DESCRIPTION - LOCATION: LOCATION: HEAD

## 2018-02-06 ASSESSMENT — PAIN DESCRIPTION - DESCRIPTORS: DESCRIPTORS: ACHING

## 2018-02-06 NOTE — ED PROVIDER NOTES
by mouth daily, Disp-90 tablet, R-3      potassium chloride (KLOR-CON M20) 20 MEQ extended release tablet Take 1 tablet by mouth 2 times daily, Disp-180 tablet, R-3      Multiple Vitamins-Minerals (THERAPEUTIC MULTIVITAMIN-MINERALS) tablet Take 1 tablet by mouth daily, Disp-90 tablet, R-3      clobetasol prop emollient base (CLOBETASOL PROPIONATE E) 0.05 % CREA Apply  topically daily. To affected area(s), Topical, DAILY Starting 2015, Until Discontinued, Disp-60 g, R-1, Normal      buprenorphine (BUTRANS) 7.5 MCG/HR PTWK Place 1 patch onto the skin every 7 days for 84 days. , Disp-4 patch, R-2Normal      triamterene-hydrochlorothiazide (DYAZIDE) 37.5-25 MG per capsule TAKE 1 CAPSULE EVERY DAY, Disp-90 capsule, R-3Normal      calcium carbonate-vitamin D (CALCIUM + D) 600-200 MG-UNIT TABS Take 600 mg by mouth daily, Disp-90 tablet, R-3      furosemide (LASIX) 20 MG tablet Take 1 tablet by mouth daily, Disp-90 tablet, R-3             PAST MEDICAL HISTORY         Diagnosis Date    Allergic rhinitis     Dyspepsia     Endometriosis     GERD (gastroesophageal reflux disease)     Hyperlipidemia     Hypertension     Irritable bowel syndrome     Osteoarthritis     Raynaud's phenomenon     Sprain of wrist, unspecified site     Traumatic arthropathy, forearm     Type 2 diabetes mellitus ([facility] [address]     diet-controlled    Venous insufficiency     Chronic Peripheral       SURGICAL HISTORY           Procedure Laterality Date    BONE GRAFT      COLONOSCOPY  2009    Normal    FINGER AMPUTATION      tip of left index finger    HYSTERECTOMY      NECK SURGERY         FAMILY HISTORY           Problem Relation Age of Onset    Heart Disease Mother     Diabetes Mother     Thyroid Disease Mother     Heart Attack Mother     Heart Disease Father     Heart Attack Father      Family Status   Relation Status    Mother     Father         SOCIAL HISTORY      reports that she has never smoked.  She has never Ultrasound and MRI are read by the radiologist. Plain radiographic images are visualized and preliminarily interpreted by the emergency physician with the below findings:    None indicated    ED BEDSIDE ULTRASOUND:   Performed by ED Physician - none    LABS:  Labs Reviewed   CBC WITH AUTO DIFFERENTIAL - Abnormal; Notable for the following:        Result Value    Monocytes 12 (*)     All other components within normal limits   BASIC METABOLIC PANEL - Abnormal; Notable for the following:     Glucose 146 (*)     Bun/Cre Ratio 24 (*)     Potassium 3.2 (*)     All other components within normal limits   RAPID INFLUENZA A/B ANTIGENS     All other labs were within normal range or not returned as of this dictation. EMERGENCY DEPARTMENT COURSE and DIFFERENTIAL DIAGNOSIS/MDM:   Vitals:    Vitals:    02/06/18 0432 02/06/18 0607 02/06/18 0618   BP: 128/67 120/64 120/64   Pulse: 70 70 72   Resp: 18 14 16   Temp: 95.9 °F (35.5 °C)     TempSrc: Tympanic     SpO2: 97% 98% 98%   Weight: 155 lb (70.3 kg)     Height: 5' 3\" (1.6 m)       69 y.o. F with a history of vertigo presented with room spinning sensation and one episode of vomiting. She denies HA on my evaluation. Given her history of vertigo and the fact that these symptoms are similar to her previous episodes of vertigo, this is the most likely explanation of her symptoms. Her rapid flu test is negative and EKG is unremarkable. She was hydrated with IV fluids and treated with this nausea medicine. I do not feel that she requires any brain imaging given the similarity of these symptoms to previous exacerbations of her vertigo. We will recommend continued use of her meclizine which she has at home and will also provide a prescription for Zofran. Instructed her to follow up with her primary care doctor within the next 3 days if her symptoms persist.    CONSULTS:  None    PROCEDURES:  None indicated    FINAL IMPRESSION      1.  Vertigo        DISPOSITION/PLAN

## 2018-02-07 ENCOUNTER — CARE COORDINATION (OUTPATIENT)
Dept: CARE COORDINATION | Age: 68
End: 2018-02-07

## 2018-05-17 ENCOUNTER — HOSPITAL ENCOUNTER (OUTPATIENT)
Age: 68
Discharge: HOME | End: 2018-05-17
Payer: MEDICARE

## 2018-05-17 DIAGNOSIS — E11.9 TYPE 2 DIABETES MELLITUS WITHOUT COMPLICATION, WITHOUT LONG-TERM CURRENT USE OF INSULIN (HCC): ICD-10-CM

## 2018-05-17 DIAGNOSIS — E78.2 MIXED HYPERLIPIDEMIA: ICD-10-CM

## 2018-05-17 DIAGNOSIS — I10 ESSENTIAL HYPERTENSION: ICD-10-CM

## 2018-05-17 LAB
CREATININE URINE: 233.8 MG/DL (ref 28–217)
MICROALBUMIN/CREAT 24H UR: 42 MG/L
MICROALBUMIN/CREAT UR-RTO: 18 MCG/MG CREAT

## 2018-05-17 PROCEDURE — 82570 ASSAY OF URINE CREATININE: CPT

## 2018-05-17 PROCEDURE — 82043 UR ALBUMIN QUANTITATIVE: CPT

## 2019-06-12 ENCOUNTER — HOSPITAL ENCOUNTER (OUTPATIENT)
Age: 69
Discharge: HOME | End: 2019-06-12
Payer: MEDICARE

## 2019-06-12 DIAGNOSIS — E11.9 TYPE 2 DIABETES MELLITUS WITHOUT COMPLICATION, WITHOUT LONG-TERM CURRENT USE OF INSULIN (HCC): ICD-10-CM

## 2019-06-12 LAB
CREATININE URINE: 184.4 MG/DL (ref 28–217)
MICROALBUMIN/CREAT 24H UR: 17 MG/L
MICROALBUMIN/CREAT UR-RTO: 9 MCG/MG CREAT

## 2019-06-12 PROCEDURE — 82043 UR ALBUMIN QUANTITATIVE: CPT

## 2019-06-12 PROCEDURE — 82570 ASSAY OF URINE CREATININE: CPT

## 2019-10-14 ENCOUNTER — HOSPITAL ENCOUNTER (EMERGENCY)
Age: 69
Discharge: HOME | End: 2019-10-14
Attending: EMERGENCY MEDICINE
Payer: MEDICARE

## 2019-10-14 ENCOUNTER — APPOINTMENT (OUTPATIENT)
Dept: CT IMAGING | Age: 69
End: 2019-10-14
Payer: MEDICARE

## 2019-10-14 ENCOUNTER — HOSPITAL ENCOUNTER (EMERGENCY)
Age: 69
Discharge: ANOTHER HOSPITAL | End: 2019-10-14
Attending: EMERGENCY MEDICINE
Payer: MEDICARE

## 2019-10-14 VITALS
HEIGHT: 64 IN | BODY MASS INDEX: 27.31 KG/M2 | SYSTOLIC BLOOD PRESSURE: 134 MMHG | OXYGEN SATURATION: 98 % | RESPIRATION RATE: 18 BRPM | WEIGHT: 160 LBS | HEART RATE: 109 BPM | TEMPERATURE: 98.5 F | DIASTOLIC BLOOD PRESSURE: 60 MMHG

## 2019-10-14 VITALS
BODY MASS INDEX: 28.34 KG/M2 | SYSTOLIC BLOOD PRESSURE: 156 MMHG | RESPIRATION RATE: 16 BRPM | DIASTOLIC BLOOD PRESSURE: 89 MMHG | WEIGHT: 160 LBS | OXYGEN SATURATION: 100 % | TEMPERATURE: 98.4 F | HEART RATE: 104 BPM

## 2019-10-14 DIAGNOSIS — T14.8XXA ABRASION: Primary | ICD-10-CM

## 2019-10-14 DIAGNOSIS — T07.XXXA ABRASIONS OF MULTIPLE SITES: ICD-10-CM

## 2019-10-14 DIAGNOSIS — R42 VERTIGO: ICD-10-CM

## 2019-10-14 DIAGNOSIS — S09.90XA CLOSED HEAD INJURY, INITIAL ENCOUNTER: Primary | ICD-10-CM
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
## 2019-10-14 DIAGNOSIS — S00.03XA CONTUSION OF SCALP, INITIAL ENCOUNTER: ICD-10-CM

## 2019-10-14 DIAGNOSIS — T07.XXXA MULTIPLE TRAUMA: ICD-10-CM

## 2019-10-14 LAB
ABSOLUTE EOS #: 0.09 K/UL (ref 0–0.44)
ABSOLUTE IMMATURE GRANULOCYTE: 0.14 K/UL (ref 0–0.3)
ABSOLUTE LYMPH #: 2.21 K/UL (ref 1.1–3.7)
ABSOLUTE MONO #: 0.83 K/UL (ref 0.1–1.2)
ANION GAP SERPL CALCULATED.3IONS-SCNC: 15 MMOL/L (ref 9–17)
BASOPHILS # BLD: 0 % (ref 0–2)
BASOPHILS ABSOLUTE: <0.03 K/UL (ref 0–0.2)
BUN BLDV-MCNC: 23 MG/DL (ref 8–23)
BUN/CREAT BLD: 21 (ref 9–20)
CALCIUM SERPL-MCNC: 9 MG/DL (ref 8.6–10.4)
CHLORIDE BLD-SCNC: 99 MMOL/L (ref 98–107)
CO2: 25 MMOL/L (ref 20–31)
CREAT SERPL-MCNC: 1.12 MG/DL (ref 0.5–0.9)
DIFFERENTIAL TYPE: ABNORMAL
EKG ATRIAL RATE: 98 BPM
EKG P AXIS: 51 DEGREES
EKG P-R INTERVAL: 192 MS
EKG Q-T INTERVAL: 362 MS
EKG QRS DURATION: 82 MS
EKG QTC CALCULATION (BAZETT): 462 MS
EKG R AXIS: -47 DEGREES
EKG T AXIS: 51 DEGREES
EKG VENTRICULAR RATE: 98 BPM
EOSINOPHILS RELATIVE PERCENT: 1 % (ref 1–4)
GFR AFRICAN AMERICAN: 58 ML/MIN
GFR NON-AFRICAN AMERICAN: 48 ML/MIN
GFR SERPL CREATININE-BSD FRML MDRD: ABNORMAL ML/MIN/{1.73_M2}
GFR SERPL CREATININE-BSD FRML MDRD: ABNORMAL ML/MIN/{1.73_M2}
GLUCOSE BLD-MCNC: 183 MG/DL (ref 70–99)
HCT VFR BLD CALC: 40.2 % (ref 36.3–47.1)
HEMOGLOBIN: 13.5 G/DL (ref 11.9–15.1)
IMMATURE GRANULOCYTES: 1 %
INR BLD: 1 (ref 0.9–1.2)
LYMPHOCYTES # BLD: 17 % (ref 24–43)
MAGNESIUM: 1.8 MG/DL (ref 1.6–2.6)
MCH RBC QN AUTO: 31.5 PG (ref 25.2–33.5)
MCHC RBC AUTO-ENTMCNC: 33.6 G/DL (ref 28.4–34.8)
MCV RBC AUTO: 93.7 FL (ref 82.6–102.9)
MONOCYTES # BLD: 7 % (ref 3–12)
NRBC AUTOMATED: 0 PER 100 WBC
PARTIAL THROMBOPLASTIN TIME: 24.7 SEC (ref 23.2–34.4)
PDW BLD-RTO: 12.6 % (ref 11.8–14.4)
PLATELET # BLD: 319 K/UL (ref 138–453)
PLATELET ESTIMATE: ABNORMAL
PMV BLD AUTO: 9.8 FL (ref 8.1–13.5)
POTASSIUM SERPL-SCNC: 3.3 MMOL/L (ref 3.7–5.3)
PROTHROMBIN TIME: 10.3 SEC (ref 9.7–12.2)
RBC # BLD: 4.29 M/UL (ref 3.95–5.11)
RBC # BLD: ABNORMAL 10*6/UL
SEG NEUTROPHILS: 74 % (ref 36–65)
SEGMENTED NEUTROPHILS ABSOLUTE COUNT: 9.57 K/UL (ref 1.5–8.1)
SODIUM BLD-SCNC: 139 MMOL/L (ref 135–144)
TROPONIN INTERP: NORMAL
TROPONIN T: <0.03 NG/ML
TROPONIN, HIGH SENSITIVITY: NORMAL NG/L (ref 0–14)
WBC # BLD: 12.9 K/UL (ref 3.5–11.3)
WBC # BLD: ABNORMAL 10*3/UL

## 2019-10-14 PROCEDURE — 99285 EMERGENCY DEPT VISIT HI MDM: CPT

## 2019-10-14 PROCEDURE — 6360000002 HC RX W HCPCS: Performed by: EMERGENCY MEDICINE

## 2019-10-14 PROCEDURE — 96374 THER/PROPH/DIAG INJ IV PUSH: CPT

## 2019-10-14 PROCEDURE — 90715 TDAP VACCINE 7 YRS/> IM: CPT | Performed by: EMERGENCY MEDICINE

## 2019-10-14 PROCEDURE — 70450 CT HEAD/BRAIN W/O DYE: CPT

## 2019-10-14 PROCEDURE — 6370000000 HC RX 637 (ALT 250 FOR IP): Performed by: STUDENT IN AN ORGANIZED HEALTH CARE EDUCATION/TRAINING PROGRAM

## 2019-10-14 PROCEDURE — 84484 ASSAY OF TROPONIN QUANT: CPT

## 2019-10-14 PROCEDURE — 2580000003 HC RX 258: Performed by: EMERGENCY MEDICINE

## 2019-10-14 PROCEDURE — 71250 CT THORAX DX C-: CPT

## 2019-10-14 PROCEDURE — 83735 ASSAY OF MAGNESIUM: CPT

## 2019-10-14 PROCEDURE — 93005 ELECTROCARDIOGRAM TRACING: CPT | Performed by: EMERGENCY MEDICINE

## 2019-10-14 PROCEDURE — 72125 CT NECK SPINE W/O DYE: CPT

## 2019-10-14 PROCEDURE — 85610 PROTHROMBIN TIME: CPT

## 2019-10-14 PROCEDURE — 85025 COMPLETE CBC W/AUTO DIFF WBC: CPT

## 2019-10-14 PROCEDURE — 6360000004 HC RX CONTRAST MEDICATION: Performed by: EMERGENCY MEDICINE

## 2019-10-14 PROCEDURE — 74177 CT ABD & PELVIS W/CONTRAST: CPT

## 2019-10-14 PROCEDURE — 36415 COLL VENOUS BLD VENIPUNCTURE: CPT

## 2019-10-14 PROCEDURE — 93010 ELECTROCARDIOGRAM REPORT: CPT | Performed by: FAMILY MEDICINE

## 2019-10-14 PROCEDURE — 90471 IMMUNIZATION ADMIN: CPT | Performed by: EMERGENCY MEDICINE

## 2019-10-14 PROCEDURE — 99284 EMERGENCY DEPT VISIT MOD MDM: CPT

## 2019-10-14 PROCEDURE — 85730 THROMBOPLASTIN TIME PARTIAL: CPT

## 2019-10-14 PROCEDURE — 80048 BASIC METABOLIC PNL TOTAL CA: CPT

## 2019-10-14 RX ORDER — ONDANSETRON 2 MG/ML
4 INJECTION INTRAMUSCULAR; INTRAVENOUS ONCE
Status: COMPLETED | OUTPATIENT
Start: 2019-10-14 | End: 2019-10-14

## 2019-10-14 RX ORDER — ACETAMINOPHEN 500 MG
500 TABLET ORAL EVERY 6 HOURS PRN
Qty: 28 TABLET | Refills: 0 | Status: ON HOLD | OUTPATIENT
Start: 2019-10-14 | End: 2019-12-17

## 2019-10-14 RX ORDER — MECLIZINE HCL 12.5 MG/1
25 TABLET ORAL ONCE
Status: COMPLETED | OUTPATIENT
Start: 2019-10-14 | End: 2019-10-14

## 2019-10-14 RX ORDER — MECLIZINE HYDROCHLORIDE 25 MG/1
25 TABLET ORAL 3 TIMES DAILY PRN
Qty: 30 TABLET | Refills: 0 | Status: SHIPPED | OUTPATIENT
Start: 2019-10-14 | End: 2020-03-31 | Stop reason: SDUPTHER

## 2019-10-14 RX ORDER — 0.9 % SODIUM CHLORIDE 0.9 %
1000 INTRAVENOUS SOLUTION INTRAVENOUS ONCE
Status: COMPLETED | OUTPATIENT
Start: 2019-10-14 | End: 2019-10-14

## 2019-10-14 RX ORDER — OXYCODONE HYDROCHLORIDE AND ACETAMINOPHEN 5; 325 MG/1; MG/1
1 TABLET ORAL ONCE
Status: COMPLETED | OUTPATIENT
Start: 2019-10-14 | End: 2019-10-14

## 2019-10-14 RX ORDER — OXYCODONE HYDROCHLORIDE 5 MG/1
5 TABLET ORAL EVERY 6 HOURS PRN
Qty: 6 TABLET | Refills: 0 | Status: SHIPPED | OUTPATIENT
Start: 2019-10-14 | End: 2020-03-13 | Stop reason: ALTCHOICE

## 2019-10-14 RX ORDER — IBUPROFEN 600 MG/1
600 TABLET ORAL EVERY 6 HOURS PRN
Qty: 28 TABLET | Refills: 0 | Status: SHIPPED | OUTPATIENT
Start: 2019-10-14 | End: 2020-05-19

## 2019-10-14 RX ADMIN — ONDANSETRON 4 MG: 2 INJECTION INTRAMUSCULAR; INTRAVENOUS at 16:32

## 2019-10-14 RX ADMIN — IOPAMIDOL 75 ML: 755 INJECTION, SOLUTION INTRAVENOUS at 15:33

## 2019-10-14 RX ADMIN — MECLIZINE HYDROCHLORIDE 25 MG: 12.5 TABLET, FILM COATED ORAL at 19:50

## 2019-10-14 RX ADMIN — SODIUM CHLORIDE 1000 ML: 9 INJECTION, SOLUTION INTRAVENOUS at 15:40

## 2019-10-14 RX ADMIN — OXYCODONE HYDROCHLORIDE AND ACETAMINOPHEN 1 TABLET: 5; 325 TABLET ORAL at 19:51

## 2019-10-14 RX ADMIN — TETANUS TOXOID, REDUCED DIPHTHERIA TOXOID AND ACELLULAR PERTUSSIS VACCINE, ADSORBED 0.5 ML: 5; 2.5; 8; 8; 2.5 SUSPENSION INTRAMUSCULAR at 15:39

## 2019-10-14 ASSESSMENT — PAIN DESCRIPTION - LOCATION
LOCATION: HEAD
LOCATION: HEAD

## 2019-10-14 ASSESSMENT — PAIN DESCRIPTION - PAIN TYPE
TYPE: ACUTE PAIN
TYPE: ACUTE PAIN

## 2019-10-14 ASSESSMENT — PAIN SCALES - GENERAL
PAINLEVEL_OUTOF10: 8
PAINLEVEL_OUTOF10: 10
PAINLEVEL_OUTOF10: 7

## 2019-10-14 ASSESSMENT — PAIN DESCRIPTION - FREQUENCY: FREQUENCY: CONTINUOUS

## 2019-10-14 ASSESSMENT — PAIN DESCRIPTION - DESCRIPTORS: DESCRIPTORS: HEADACHE

## 2019-10-15 ASSESSMENT — ENCOUNTER SYMPTOMS
EYE REDNESS: 0
ABDOMINAL PAIN: 0
CHEST TIGHTNESS: 0
EYE DISCHARGE: 0
SHORTNESS OF BREATH: 0

## 2019-11-12 ENCOUNTER — TELEPHONE (OUTPATIENT)
Dept: PAIN MANAGEMENT | Age: 69
End: 2019-11-12

## 2019-12-06 PROBLEM — Z12.11 COLON CANCER SCREENING: Status: ACTIVE | Noted: 2019-12-06

## 2019-12-17 ENCOUNTER — HOSPITAL ENCOUNTER (OUTPATIENT)
Age: 69
Setting detail: OUTPATIENT SURGERY
Discharge: HOME | End: 2019-12-17
Attending: INTERNAL MEDICINE | Admitting: INTERNAL MEDICINE
Payer: MEDICARE

## 2019-12-17 ENCOUNTER — ANESTHESIA (OUTPATIENT)
Dept: OPERATING ROOM | Age: 69
End: 2019-12-17
Payer: MEDICARE

## 2019-12-17 ENCOUNTER — ANESTHESIA EVENT (OUTPATIENT)
Dept: OPERATING ROOM | Age: 69
End: 2019-12-17
Payer: MEDICARE

## 2019-12-17 VITALS
SYSTOLIC BLOOD PRESSURE: 164 MMHG | OXYGEN SATURATION: 95 % | RESPIRATION RATE: 37 BRPM | DIASTOLIC BLOOD PRESSURE: 83 MMHG

## 2019-12-17 VITALS
BODY MASS INDEX: 28.35 KG/M2 | SYSTOLIC BLOOD PRESSURE: 131 MMHG | WEIGHT: 160 LBS | OXYGEN SATURATION: 97 % | TEMPERATURE: 97.3 F | DIASTOLIC BLOOD PRESSURE: 64 MMHG | HEART RATE: 76 BPM | RESPIRATION RATE: 16 BRPM | HEIGHT: 63 IN

## 2019-12-17 PROCEDURE — 3700000001 HC ADD 15 MINUTES (ANESTHESIA): Performed by: INTERNAL MEDICINE

## 2019-12-17 PROCEDURE — 7100000010 HC PHASE II RECOVERY - FIRST 15 MIN: Performed by: INTERNAL MEDICINE

## 2019-12-17 PROCEDURE — 3700000000 HC ANESTHESIA ATTENDED CARE: Performed by: INTERNAL MEDICINE

## 2019-12-17 PROCEDURE — 2709999900 HC NON-CHARGEABLE SUPPLY: Performed by: INTERNAL MEDICINE

## 2019-12-17 PROCEDURE — 3609027000 HC COLONOSCOPY: Performed by: INTERNAL MEDICINE

## 2019-12-17 PROCEDURE — G0121 COLON CA SCRN NOT HI RSK IND: HCPCS | Performed by: INTERNAL MEDICINE

## 2019-12-17 PROCEDURE — 2580000003 HC RX 258: Performed by: INTERNAL MEDICINE

## 2019-12-17 PROCEDURE — 6360000002 HC RX W HCPCS: Performed by: NURSE ANESTHETIST, CERTIFIED REGISTERED

## 2019-12-17 PROCEDURE — 7100000011 HC PHASE II RECOVERY - ADDTL 15 MIN: Performed by: INTERNAL MEDICINE

## 2019-12-17 RX ORDER — SODIUM CHLORIDE, SODIUM LACTATE, POTASSIUM CHLORIDE, CALCIUM CHLORIDE 600; 310; 30; 20 MG/100ML; MG/100ML; MG/100ML; MG/100ML
INJECTION, SOLUTION INTRAVENOUS CONTINUOUS
Status: DISCONTINUED | OUTPATIENT
Start: 2019-12-17 | End: 2019-12-17 | Stop reason: HOSPADM

## 2019-12-17 RX ORDER — PROPOFOL 10 MG/ML
INJECTION, EMULSION INTRAVENOUS PRN
Status: DISCONTINUED | OUTPATIENT
Start: 2019-12-17 | End: 2019-12-17 | Stop reason: SDUPTHER

## 2019-12-17 RX ADMIN — PROPOFOL 100 MG: 10 INJECTION, EMULSION INTRAVENOUS at 09:55

## 2019-12-17 RX ADMIN — SODIUM CHLORIDE, POTASSIUM CHLORIDE, SODIUM LACTATE AND CALCIUM CHLORIDE: 600; 310; 30; 20 INJECTION, SOLUTION INTRAVENOUS at 08:50

## 2019-12-17 RX ADMIN — PROPOFOL 100 MG: 10 INJECTION, EMULSION INTRAVENOUS at 09:50

## 2019-12-17 RX ADMIN — PROPOFOL 100 MG: 10 INJECTION, EMULSION INTRAVENOUS at 09:45

## 2019-12-17 ASSESSMENT — PAIN SCALES - GENERAL
PAINLEVEL_OUTOF10: 0

## 2019-12-17 ASSESSMENT — PAIN - FUNCTIONAL ASSESSMENT: PAIN_FUNCTIONAL_ASSESSMENT: 0-10

## 2020-01-13 PROBLEM — Z12.11 COLON CANCER SCREENING: Status: RESOLVED | Noted: 2019-12-06 | Resolved: 2020-01-13

## 2020-01-21 ENCOUNTER — HOSPITAL ENCOUNTER (OUTPATIENT)
Dept: PAIN MANAGEMENT | Age: 70
Discharge: HOME | End: 2020-01-21
Payer: COMMERCIAL

## 2020-01-21 VITALS
WEIGHT: 167.5 LBS | TEMPERATURE: 97.6 F | SYSTOLIC BLOOD PRESSURE: 111 MMHG | HEART RATE: 78 BPM | DIASTOLIC BLOOD PRESSURE: 88 MMHG | BODY MASS INDEX: 27.91 KG/M2 | HEIGHT: 65 IN | RESPIRATION RATE: 18 BRPM

## 2020-01-21 PROBLEM — M54.2 CERVICALGIA: Chronic | Status: ACTIVE | Noted: 2020-01-21

## 2020-01-21 LAB
AMPHETAMINE SCREEN URINE: NEGATIVE
BARBITURATE SCREEN URINE: NEGATIVE
BENZODIAZEPINE SCREEN, URINE: NEGATIVE
BUPRENORPHINE URINE: NEGATIVE
CANNABINOID SCREEN URINE: NEGATIVE
COCAINE METABOLITE, URINE: NEGATIVE
MDMA URINE: NORMAL
METHADONE SCREEN, URINE: NEGATIVE
METHAMPHETAMINE, URINE: NEGATIVE
OPIATES, URINE: NEGATIVE
OXYCODONE SCREEN URINE: NEGATIVE
PHENCYCLIDINE, URINE: NEGATIVE
PROPOXYPHENE, URINE: NEGATIVE
TEST INFORMATION: NORMAL
TRICYCLIC ANTIDEPRESSANTS, UR: NEGATIVE

## 2020-01-21 PROCEDURE — 97814 ACUP 1/> W/ESTIM EA ADDL 15: CPT | Performed by: ANESTHESIOLOGY

## 2020-01-21 PROCEDURE — 97813 ACUP 1/> W/ESTIM 1ST 15 MIN: CPT | Performed by: ANESTHESIOLOGY

## 2020-01-21 PROCEDURE — 99204 OFFICE O/P NEW MOD 45 MIN: CPT | Performed by: ANESTHESIOLOGY

## 2020-01-21 PROCEDURE — 99201 HC NEW PT, E/M LEVEL 1: CPT

## 2020-01-21 PROCEDURE — 80306 DRUG TEST PRSMV INSTRMNT: CPT

## 2020-01-21 RX ORDER — BUPRENORPHINE 5 UG/H
1 PATCH TRANSDERMAL WEEKLY
Qty: 8 PATCH | Refills: 0 | Status: SHIPPED | OUTPATIENT
Start: 2020-01-21 | End: 2020-03-21

## 2020-01-21 SDOH — HEALTH STABILITY: MENTAL HEALTH
STRESS IS WHEN SOMEONE FEELS TENSE, NERVOUS, ANXIOUS, OR CAN'T SLEEP AT NIGHT BECAUSE THEIR MIND IS TROUBLED. HOW STRESSED ARE YOU?: ONLY A LITTLE

## 2020-01-21 SDOH — ECONOMIC STABILITY: FOOD INSECURITY: WITHIN THE PAST 12 MONTHS, YOU WORRIED THAT YOUR FOOD WOULD RUN OUT BEFORE YOU GOT MONEY TO BUY MORE.: NEVER TRUE

## 2020-01-21 SDOH — ECONOMIC STABILITY: TRANSPORTATION INSECURITY
IN THE PAST 12 MONTHS, HAS LACK OF TRANSPORTATION KEPT YOU FROM MEETINGS, WORK, OR FROM GETTING THINGS NEEDED FOR DAILY LIVING?: NO

## 2020-01-21 SDOH — ECONOMIC STABILITY: FOOD INSECURITY: WITHIN THE PAST 12 MONTHS, THE FOOD YOU BOUGHT JUST DIDN'T LAST AND YOU DIDN'T HAVE MONEY TO GET MORE.: NEVER TRUE

## 2020-01-21 SDOH — HEALTH STABILITY: MENTAL HEALTH: HOW OFTEN DO YOU HAVE A DRINK CONTAINING ALCOHOL?: NEVER

## 2020-01-21 SDOH — HEALTH STABILITY: PHYSICAL HEALTH: ON AVERAGE, HOW MANY DAYS PER WEEK DO YOU ENGAGE IN MODERATE TO STRENUOUS EXERCISE (LIKE A BRISK WALK)?: 0 DAYS

## 2020-01-21 SDOH — HEALTH STABILITY: PHYSICAL HEALTH: ON AVERAGE, HOW MANY MINUTES DO YOU ENGAGE IN EXERCISE AT THIS LEVEL?: 0 MIN

## 2020-01-21 SDOH — ECONOMIC STABILITY: TRANSPORTATION INSECURITY
IN THE PAST 12 MONTHS, HAS THE LACK OF TRANSPORTATION KEPT YOU FROM MEDICAL APPOINTMENTS OR FROM GETTING MEDICATIONS?: NO

## 2020-01-21 SDOH — ECONOMIC STABILITY: INCOME INSECURITY: HOW HARD IS IT FOR YOU TO PAY FOR THE VERY BASICS LIKE FOOD, HOUSING, MEDICAL CARE, AND HEATING?: NOT HARD AT ALL

## 2020-01-21 ASSESSMENT — PAIN DESCRIPTION - ORIENTATION: ORIENTATION: LEFT

## 2020-01-21 ASSESSMENT — PAIN DESCRIPTION - ONSET: ONSET: ON-GOING

## 2020-01-21 ASSESSMENT — PAIN DESCRIPTION - FREQUENCY: FREQUENCY: CONTINUOUS

## 2020-01-21 ASSESSMENT — PAIN DESCRIPTION - PAIN TYPE: TYPE: CHRONIC PAIN

## 2020-01-21 ASSESSMENT — PAIN DESCRIPTION - LOCATION: LOCATION: ARM;WRIST

## 2020-01-21 ASSESSMENT — PAIN DESCRIPTION - PROGRESSION: CLINICAL_PROGRESSION: NOT CHANGED

## 2020-01-21 ASSESSMENT — PAIN DESCRIPTION - DESCRIPTORS: DESCRIPTORS: ACHING;THROBBING;BURNING

## 2020-01-21 ASSESSMENT — PAIN SCALES - GENERAL: PAINLEVEL_OUTOF10: 7

## 2020-01-21 ASSESSMENT — PAIN - FUNCTIONAL ASSESSMENT: PAIN_FUNCTIONAL_ASSESSMENT: ACTIVITIES ARE NOT PREVENTED

## 2020-01-21 NOTE — PROGRESS NOTES
01/21/20   Drug Abuse Screening Test - DAST-10  731  These questions refer to the past 12 Months    Never=0 Seldom=1 Sometimes=2 Often=3 Very Often=4   1. How often do you have mood swings?    x      2. How often have you felt a need for higher doses  of medication to treat your pain?  x      3. How often have you felt impatient with your  doctors? x       4. How often have you felt that things are just too  overwhelming that you can't handle them?   x     5. How often is there tension in the home?   x     6. How often have you counted pain pills to see  how many are remaining? x       7. How often have you been concerned that people  will  you for taking pain medication? x       8. How often do you feel bored?  x      9. How often have you taken more pain medication  than you were supposed to? x       10. How often have you worried about being left  alone? x       11. How often have you felt a craving for  medication? x       12. How often have others expressed concern over  your use of medication? x       13. How often have any of your close friends had a  problem with alcohol or drugs? x       14. How often have others told you that you had a  bad temper?  x      15. How often have you felt consumed by the need  to get pain medication?   x     16. How often have you run out of pain medication  early?   x     17. How often have others kept you from getting  what you deserve?  x      18. How often, in your lifetime, have you had legal  problems or been arrested? x       19. How often have you attended an [place] [id number] or   meeting? x       20. How often have you been in an argument that  was so out of control that someone got hurt? x       21. How often have you been sexually abused? x       22. How often have others suggested that you have  a drug or alcohol problem? x       23. How often have you had to borrow pain  medications from your family or friends? x       24.  How often have you been treated for an alcohol  or drug problem? x         TOTAL SCORE: 13    Sum of Questions SOAPP-R Indication   > or = 18 +   < 18 -

## 2020-01-21 NOTE — PROGRESS NOTES
topically 4 times daily as needed (inflammation) 5 Tube 2    aspirin 81 MG tablet Take 1 tablet by mouth daily 90 tablet 3    calcium carbonate-vitamin D (CALCIUM + D) 600-200 MG-UNIT TABS Take 600 mg by mouth daily 90 tablet 3    Multiple Vitamins-Minerals (THERAPEUTIC MULTIVITAMIN-MINERALS) tablet Take 1 tablet by mouth daily 90 tablet 3    ibuprofen (ADVIL;MOTRIN) 600 MG tablet Take 1 tablet by mouth every 6 hours as needed for Pain 28 tablet 0     No current facility-administered medications for this encounter.         No Known Allergies    Past Medical History:   Diagnosis Date    Allergic rhinitis     Dyspepsia     Endometriosis     GERD (gastroesophageal reflux disease)     Hyperlipidemia     Hypertension     Irritable bowel syndrome     Osteoarthritis     Raynaud's phenomenon     Sprain of wrist, unspecified site     Traumatic arthropathy, forearm     Type 2 diabetes mellitus ([facility] [address]     diet-controlled    Venous insufficiency     Chronic Peripheral       Past Surgical History:   Procedure Laterality Date    BONE GRAFT      COLONOSCOPY  7/2009    Normal    COLONOSCOPY  12/17/2019    -diverticulosis,hemorrhoids    COLONOSCOPY N/A 12/17/2019    COLORECTAL CANCER SCREENING, NOT HIGH RISK performed by Peg Lira MD at [address]      tip of left index finger    HYSTERECTOMY      NECK SURGERY         Family History   Problem Relation Age of Onset    Heart Disease Mother     Diabetes Mother     Thyroid Disease Mother     Heart Attack Mother     Heart Disease Father     Heart Attack Father        Social History     Socioeconomic History    Marital status:      Spouse name: Not on file    Number of children: Not on file    Years of education: Not on file    Highest education level: Not on file   Occupational History    Not on file   Social Needs    Financial resource strain: Not hard at all   [employer] insecurity:     Worry: Never true time.   Psychiatric:         Behavior: Behavior normal.       Right Shoulder Exam   Right shoulder exam is normal.    Tenderness   The patient is experiencing tenderness in the acromioclavicular joint, biceps tendon, acromion and clavicle. Range of Motion   Active abduction: normal   Passive abduction: normal     Muscle Strength   Abduction: 2/5   Internal rotation: 2/5   External rotation: 2/5   Supraspinatus: 2/5   Subscapularis: 2/5   Biceps: 2/5     Tests   Apprehension: positive  Cross arm: positive  Impingement: negative  Drop arm: positive  Sulcus: absent    Other   Scars: present      Left Shoulder Exam   Left shoulder exam is normal.    Tenderness   The patient is experiencing tenderness in the acromioclavicular joint, acromion and biceps tendon.     Range of Motion   Active abduction: normal   Passive abduction: normal   Extension: normal   External rotation: normal   Forward flexion: normal     Muscle Strength   Abduction: 2/5   Internal rotation: 2/5   External rotation: 2/5   Supraspinatus: 2/5   Subscapularis: 2/5   Biceps: 2/5     Tests   Apprehension: positive  Taylor test: negative  Cross arm: positive  Impingement: negative  Drop arm: positive  Sulcus: absent    Other   Scars: present             LABS:    Lab Results   Component Value Date/Time    AMPHSUR NEGATIVE 04/23/2015 09:00 AM    BARBSCNU NEGATIVE 04/23/2015 09:00 AM    LABBENZ NEGATIVE 04/23/2015 09:00 AM    LABBENZ NEGATIVE 02/07/2013 09:30 AM    COCAINEMETUR NEGATIVE 04/23/2015 09:00 AM    LABMETH NEGATIVE 04/23/2015 09:00 AM    OPIAU NEGATIVE 04/23/2015 09:00 AM    PHENCYCU NEGATIVE 04/23/2015 09:00 AM    PPXUR Not Detected 02/01/2018 10:10 AM    CANSU NEGATIVE 04/23/2015 09:00 AM    OXTCOSU NEGATIVE 04/23/2015 09:00 AM    METAMPU Not Detected 02/01/2018 10:10 AM    TRICYUR NEGATIVE 04/23/2015 09:00 AM    MDMA Not Detected 02/01/2018 10:10 AM    BUPRENUR Not Detected 02/01/2018 10:10 AM    LABTEST NOT REPORTED 04/23/2015 09:00 AM

## 2020-01-23 ENCOUNTER — HOSPITAL ENCOUNTER (OUTPATIENT)
Dept: WOMENS IMAGING | Age: 70
Discharge: HOME | End: 2020-01-25
Payer: MEDICARE

## 2020-01-23 PROCEDURE — 77067 SCR MAMMO BI INCL CAD: CPT

## 2020-03-17 ENCOUNTER — HOSPITAL ENCOUNTER (OUTPATIENT)
Dept: PAIN MANAGEMENT | Age: 70
Discharge: HOME | End: 2020-03-17
Payer: COMMERCIAL

## 2020-03-17 VITALS
SYSTOLIC BLOOD PRESSURE: 136 MMHG | HEART RATE: 81 BPM | WEIGHT: 162.7 LBS | RESPIRATION RATE: 18 BRPM | HEIGHT: 65 IN | TEMPERATURE: 96.6 F | BODY MASS INDEX: 27.11 KG/M2 | DIASTOLIC BLOOD PRESSURE: 76 MMHG

## 2020-03-17 PROCEDURE — 99213 OFFICE O/P EST LOW 20 MIN: CPT | Performed by: ANESTHESIOLOGY

## 2020-03-17 PROCEDURE — 99212 OFFICE O/P EST SF 10 MIN: CPT

## 2020-03-17 PROCEDURE — 97814 ACUP 1/> W/ESTIM EA ADDL 15: CPT | Performed by: ANESTHESIOLOGY

## 2020-03-17 PROCEDURE — 97813 ACUP 1/> W/ESTIM 1ST 15 MIN: CPT | Performed by: ANESTHESIOLOGY

## 2020-03-17 RX ORDER — BUPRENORPHINE 5 UG/H
1 PATCH TRANSDERMAL WEEKLY
Qty: 4 PATCH | Refills: 1 | Status: SHIPPED | OUTPATIENT
Start: 2020-03-25 | End: 2020-05-19 | Stop reason: ALTCHOICE

## 2020-03-17 ASSESSMENT — PAIN DESCRIPTION - ORIENTATION: ORIENTATION: LEFT

## 2020-03-17 ASSESSMENT — PAIN DESCRIPTION - DESCRIPTORS: DESCRIPTORS: ACHING;THROBBING;BURNING

## 2020-03-17 ASSESSMENT — PAIN SCALES - GENERAL: PAINLEVEL_OUTOF10: 7

## 2020-03-17 ASSESSMENT — PAIN DESCRIPTION - FREQUENCY: FREQUENCY: CONTINUOUS

## 2020-03-17 ASSESSMENT — PAIN DESCRIPTION - ONSET: ONSET: ON-GOING

## 2020-03-17 ASSESSMENT — PAIN DESCRIPTION - PROGRESSION: CLINICAL_PROGRESSION: NOT CHANGED

## 2020-03-17 ASSESSMENT — PAIN DESCRIPTION - LOCATION: LOCATION: ARM;WRIST;FINGER (COMMENT WHICH ONE)

## 2020-03-17 ASSESSMENT — PAIN DESCRIPTION - PAIN TYPE: TYPE: CHRONIC PAIN

## 2020-03-17 ASSESSMENT — PAIN - FUNCTIONAL ASSESSMENT: PAIN_FUNCTIONAL_ASSESSMENT: ACTIVITIES ARE NOT PREVENTED

## 2020-03-17 NOTE — PROGRESS NOTES
PROGRESS:  Sakshi Santo is doing pretty good no problems her pain is much better she still describes it as 7 but she says she is doing much better but she has very tender spots on her lower lumbar  Region and rhomboid region  On examination she has a negative straight leg raising and very tender spots on both sides from L1-L5 and T1-T10    Current Pain Assessment  Pain Assessment  Pain Assessment: 0-10  Pain Level: 7  Pain Type: Chronic pain  Pain Location: Arm, Wrist, Finger (Comment which one)  Pain Orientation: Left  Pain Descriptors: Aching, Throbbing, Burning  Pain Frequency: Continuous  Pain Onset: On-going  Clinical Progression: Not changed  Functional Pain Assessment: Activities are not prevented       ADVERSE MEDICATION EFFECTS:   Nausea and vomiting: n   Constipation:     n                Dizziness/drowsy/sleepy-n  Urinary Retention: n    ACTIVITY/SOCIAL/EMOTIONAL:  Sleep Pattern: 4hrs  Home Exercises: daily  Activity:increased  Emotional Issues: normal.         ABERRANT BEHAVIORS SINCE LAST VISIT  Lost rx/pills:------------------------------------------ n  Taking  medication as prescribed: -----------y  Urine Drug Screen --------------------------------- y  Recent ER visits: ------------------------------------n  Pill count is appropriate: ---------------------------y   Refills for prescriptions appropriate:----------y    LABS:    Lab Results   Component Value Date/Time    AMPHSUR NEGATIVE 01/21/2020 10:30 AM    BARBSCNU NEGATIVE 01/21/2020 10:30 AM    LABBENZ NEGATIVE 01/21/2020 10:30 AM    LABBENZ NEGATIVE 02/07/2013 09:30 AM    COCAINEMETUR NEGATIVE 01/21/2020 10:30 AM    LABMETH NEGATIVE 01/21/2020 10:30 AM    OPIAU NEGATIVE 01/21/2020 10:30 AM    PHENCYCU NEGATIVE 01/21/2020 10:30 AM    PPXUR NEGATIVE 01/21/2020 10:30 AM    CANSU NEGATIVE 01/21/2020 10:30 AM    OXTCOSU NEGATIVE 01/21/2020 10:30 AM    METAMPU NEGATIVE 01/21/2020 10:30 AM    TRICYUR NEGATIVE 01/21/2020 10:30 AM    MDMA NOT REPORTED 01/21/2020 10:30 AM    BUPRENUR NEGATIVE 01/21/2020 10:30 AM    LABTEST NOT REPORTED 01/21/2020 10:30 AM       Lab Results   Component Value Date/Time    MG 1.8 10/14/2019 03:05 PM       No results found for: KATHY, MPO, PR3, C5, HEPCAB    IMAGING:    No results found. DIAGNOSIS:  Principal Problem:    Traumatic arthropathy involving hand, right  Active Problems:    Hypertension    Chronic pain  Resolved Problems:    * No resolved hospital problems.  *      TREATMENT   Meditation routine was discussed  Breathing exercises and how to do them was discussed  Counseling for chronic pain was done  loretta needle therapy to all tender point e-stim at the upper points    Exercises were shown and how to do them was discussed        Acupuncture:30 min with electric stim  Next visit     :          CHRONIC PAIN MANAGEMENT PATIENT  Pain Agreement Signed: y  Risk and benefits of opioid have been discussed with the patient.y  Acupuncture Consent Signed: y  Non-opioid trials were inappropriate and provided insufficient pain relief   OARRS Attested: y      (Please note that portions of this note were completed with a voice recognition program.  Efforts were made to edit the dictations but occasionallywords are mis-transcribed.)    Electronically signed by Gill Wang MD on 3/17/2020 at 9:29 AM

## 2020-03-24 ENCOUNTER — HOSPITAL ENCOUNTER (OUTPATIENT)
Age: 70
Discharge: HOME | End: 2020-03-24
Payer: MEDICARE

## 2020-03-24 LAB
ESTIMATED AVERAGE GLUCOSE: 146 MG/DL
HBA1C MFR BLD: 6.7 % (ref 4.8–5.9)

## 2020-03-24 PROCEDURE — 83036 HEMOGLOBIN GLYCOSYLATED A1C: CPT

## 2020-03-24 PROCEDURE — 36415 COLL VENOUS BLD VENIPUNCTURE: CPT

## 2020-05-19 ENCOUNTER — HOSPITAL ENCOUNTER (OUTPATIENT)
Dept: PAIN MANAGEMENT | Age: 70
Discharge: HOME | End: 2020-05-19
Payer: MEDICARE

## 2020-05-19 VITALS
RESPIRATION RATE: 18 BRPM | TEMPERATURE: 98 F | SYSTOLIC BLOOD PRESSURE: 147 MMHG | HEART RATE: 77 BPM | HEIGHT: 65 IN | DIASTOLIC BLOOD PRESSURE: 68 MMHG | WEIGHT: 168.2 LBS | BODY MASS INDEX: 28.02 KG/M2

## 2020-05-19 PROCEDURE — 97813 ACUP 1/> W/ESTIM 1ST 15 MIN: CPT | Performed by: ANESTHESIOLOGY

## 2020-05-19 PROCEDURE — 99213 OFFICE O/P EST LOW 20 MIN: CPT | Performed by: ANESTHESIOLOGY

## 2020-05-19 PROCEDURE — 99213 OFFICE O/P EST LOW 20 MIN: CPT

## 2020-05-19 PROCEDURE — 97814 ACUP 1/> W/ESTIM EA ADDL 15: CPT | Performed by: ANESTHESIOLOGY

## 2020-05-19 RX ORDER — BUPRENORPHINE 5 UG/H
1 PATCH TRANSDERMAL WEEKLY
Qty: 4 PATCH | Refills: 2 | Status: SHIPPED | OUTPATIENT
Start: 2020-05-19 | End: 2021-03-02 | Stop reason: ALTCHOICE

## 2020-05-19 ASSESSMENT — PAIN DESCRIPTION - ORIENTATION: ORIENTATION: LEFT

## 2020-05-19 ASSESSMENT — PAIN DESCRIPTION - PAIN TYPE: TYPE: CHRONIC PAIN

## 2020-05-19 ASSESSMENT — PAIN SCALES - GENERAL: PAINLEVEL_OUTOF10: 7

## 2020-05-19 ASSESSMENT — PAIN DESCRIPTION - DESCRIPTORS: DESCRIPTORS: ACHING;BURNING;THROBBING

## 2020-05-19 ASSESSMENT — PAIN DESCRIPTION - PROGRESSION: CLINICAL_PROGRESSION: NOT CHANGED

## 2020-05-19 ASSESSMENT — PAIN DESCRIPTION - FREQUENCY: FREQUENCY: CONTINUOUS

## 2020-05-19 ASSESSMENT — PAIN - FUNCTIONAL ASSESSMENT: PAIN_FUNCTIONAL_ASSESSMENT: ACTIVITIES ARE NOT PREVENTED

## 2020-05-19 ASSESSMENT — PAIN DESCRIPTION - LOCATION: LOCATION: ARM;WRIST;FINGER (COMMENT WHICH ONE)

## 2020-05-19 ASSESSMENT — PAIN DESCRIPTION - ONSET: ONSET: ON-GOING

## 2020-05-19 NOTE — PROGRESS NOTES
LABMETH NEGATIVE 01/21/2020 10:30 AM    OPIAU NEGATIVE 01/21/2020 10:30 AM    PHENCYCU NEGATIVE 01/21/2020 10:30 AM    PPXUR NEGATIVE 01/21/2020 10:30 AM    CANSU NEGATIVE 01/21/2020 10:30 AM    OXTCOSU NEGATIVE 01/21/2020 10:30 AM    METAMPU NEGATIVE 01/21/2020 10:30 AM    TRICYUR NEGATIVE 01/21/2020 10:30 AM    MDMA NOT REPORTED 01/21/2020 10:30 AM    BUPRENUR NEGATIVE 01/21/2020 10:30 AM    LABTEST NOT REPORTED 01/21/2020 10:30 AM       Lab Results   Component Value Date/Time    MG 1.8 10/14/2019 03:05 PM       No results found for: KATHY, MPO, PR3, C5, HEPCAB    IMAGING:    No results found. DIAGNOSIS:  Principal Problem:    Cervicalgia  Active Problems:    Chronic pain  Resolved Problems:    * No resolved hospital problems. *      TREATMENT   Meditation routine was discussed  Breathing exercises and how to do them was discussed  Counseling for chronic pain was done    Needling was done on her neck and gallbladder 21 and levator scapulae area with 2 Hz electric stim        Ill effects of being on chronic pain medications such as sleep disturbances, hormonal changes, withdrawal symptoms,  chronic opioid dependence and tolerance were discussed with patient. I had asked the patient to minimize medication use and utilize pain medications only for uncontrolled rest pain or pain with exertional activities. I advised patient not to self escalate pain medications without consulting with us. At each of patient's future visits we will try to taper pain medications, while adjusting the adjunct medications, and re-evaluating for Physical Therapy to improve spinal and joint strength. We will continue to have discussions to decrease pain medications as tolerated. I also discussed with the patient regarding the dangers of combining narcotic pain medication with tranquilizers, alcohol or illegal drugs or taking the medication any other than prescribed. The dangers including the respiratory depression and death.  Patient was told to tell  to all  physicians regarding the medications he is getting from pain clinic. Patient is warned not to take any unprescribed medications over-the-counter medications that can depress breathing . Patient is advised to talk to the pharmacist or physicians if planning to take any over-the-counter medications before  takeing them. Patient is strongly advised to avoid tranquilizers or  Relaxants for any medications that can depress breathing or recreational drugs. Patient is also advised to tell us if there is any changes in his medications from other physicians. We discussed the same at today's visit . Exercises were shown and how to do them was discussed      Time in   :905  Time out :950  Consult   :15  Acupuncture:30 with electric stim  Next visit     :12 wks          CHRONIC PAIN MANAGEMENT PATIENT  Pain Agreement Signed: y  Risk and benefits of opioid have been discussed with the patient.   Acupuncture Consent Signed: y  Non-opioid trials were inappropriate and provided insufficient pain relief   OARRS Attested: y      (Please note that portions of this note were completed with a voice recognition program.  Efforts were made to edit the dictations but occasionallywords are mis-transcribed.)    Electronically signed by Jaida Rae MD on 5/19/2020 at 9:34 AM

## 2020-08-25 ENCOUNTER — HOSPITAL ENCOUNTER (OUTPATIENT)
Dept: PAIN MANAGEMENT | Age: 70
Discharge: HOME | End: 2020-08-25
Payer: MEDICARE

## 2020-08-25 VITALS
BODY MASS INDEX: 27.56 KG/M2 | TEMPERATURE: 97.6 F | HEIGHT: 65 IN | HEART RATE: 78 BPM | DIASTOLIC BLOOD PRESSURE: 79 MMHG | SYSTOLIC BLOOD PRESSURE: 137 MMHG | WEIGHT: 165.4 LBS | RESPIRATION RATE: 18 BRPM

## 2020-08-25 PROCEDURE — 97813 ACUP 1/> W/ESTIM 1ST 15 MIN: CPT | Performed by: ANESTHESIOLOGY

## 2020-08-25 PROCEDURE — 99213 OFFICE O/P EST LOW 20 MIN: CPT | Performed by: ANESTHESIOLOGY

## 2020-08-25 PROCEDURE — 99212 OFFICE O/P EST SF 10 MIN: CPT

## 2020-08-25 RX ORDER — VITAMIN B COMPLEX
1 CAPSULE ORAL DAILY
COMMUNITY

## 2020-08-25 RX ORDER — CETIRIZINE HYDROCHLORIDE, PSEUDOEPHEDRINE HYDROCHLORIDE 5; 120 MG/1; MG/1
1 TABLET, FILM COATED, EXTENDED RELEASE ORAL DAILY PRN
Qty: 30 TABLET | Refills: 0 | Status: SHIPPED | OUTPATIENT
Start: 2020-08-25 | End: 2020-09-24

## 2020-08-25 ASSESSMENT — PAIN DESCRIPTION - PAIN TYPE: TYPE: CHRONIC PAIN

## 2020-08-25 ASSESSMENT — PAIN DESCRIPTION - ORIENTATION: ORIENTATION: LEFT

## 2020-08-25 ASSESSMENT — PAIN DESCRIPTION - FREQUENCY: FREQUENCY: CONTINUOUS

## 2020-08-25 ASSESSMENT — PAIN - FUNCTIONAL ASSESSMENT: PAIN_FUNCTIONAL_ASSESSMENT: ACTIVITIES ARE NOT PREVENTED

## 2020-08-25 ASSESSMENT — PAIN DESCRIPTION - ONSET: ONSET: ON-GOING

## 2020-08-25 ASSESSMENT — PAIN SCALES - GENERAL: PAINLEVEL_OUTOF10: 7

## 2020-08-25 ASSESSMENT — PAIN DESCRIPTION - PROGRESSION: CLINICAL_PROGRESSION: NOT CHANGED

## 2020-08-25 NOTE — PROGRESS NOTES
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 but can ice it but she is getting improved on the Butrans patches and her movements are all normal and okay and I personally think she is getting much better she just does not want to admit  Goes around with her daily activities all right now and we will just continue her on the same  Current Pain Assessment  Pain Assessment  Pain Assessment: 0-10  Pain Level: 7  Patient's Stated Pain Goal: 1  Pain Type: Chronic pain  Pain Location: Arm, Neck, Finger (Comment which one), Wrist  Pain Orientation: Left  Pain Frequency: Continuous  Pain Onset: On-going  Clinical Progression: Not changed  Functional Pain Assessment: Activities are not prevented       ADVERSE MEDICATION EFFECTS:   Nausea and vomiting: n   Constipation:    n                 Dizziness/drowsy/sleepy-n  Urinary Retention: n    ACTIVITY/SOCIAL/EMOTIONAL:  Sleep Pattern:6rs  Home Exercises: daily  Activity:increased  Emotional Issues: normal.     ABERRANT BEHAVIORS SINCE LAST VISIT  Lost rx/pills:------------------------------------------n   Taking  medication as prescribed: -----------y  Urine Drug Screen --------------------------------- y  Recent ER visits: ------------------------------------n  Pill count is appropriate: ---------------------------y   Refills for prescriptions appropriate:----------y  Physical Exam  Constitutional:       General: She is not in acute distress. Appearance: She is not diaphoretic. HENT:      Head: Normocephalic. Right Ear: External ear normal.      Left Ear: External ear normal.      Nose: Nose normal.   Eyes:      Pupils: Pupils are equal, round, and reactive to light. Neck:      Musculoskeletal: Normal range of motion and neck supple. Muscular tenderness present. Cardiovascular:      Rate and Rhythm: Normal rate. Heart sounds: Normal heart sounds.    Pulmonary:      Effort: Pulmonary effort is normal.      Breath sounds: Normal breath sounds. Abdominal:      Palpations: Abdomen is soft. Skin:     General: Skin is warm and dry. Neurological:      Mental Status: She is alert and oriented to person, place, and time. Psychiatric:         Behavior: Behavior normal.       Back Exam     Tenderness   The patient is experiencing tenderness in the cervical.    Range of Motion   Extension: normal   Flexion: normal   Rotation right: abnormal   Rotation left: normal           She has a lot of trigger spots on her right side which need to be released lABS:    Lab Results   Component Value Date/Time    AMPHSUR NEGATIVE 01/21/2020 10:30 AM    BARBSCNU NEGATIVE 01/21/2020 10:30 AM    LABBENZ NEGATIVE 01/21/2020 10:30 AM    LABBENZ NEGATIVE 02/07/2013 09:30 AM    COCAINEMETUR NEGATIVE 01/21/2020 10:30 AM    LABMETH NEGATIVE 01/21/2020 10:30 AM    OPIAU NEGATIVE 01/21/2020 10:30 AM    PHENCYCU NEGATIVE 01/21/2020 10:30 AM    PPXUR NEGATIVE 01/21/2020 10:30 AM    CANSU NEGATIVE 01/21/2020 10:30 AM    OXTCOSU NEGATIVE 01/21/2020 10:30 AM    METAMPU NEGATIVE 01/21/2020 10:30 AM    TRICYUR NEGATIVE 01/21/2020 10:30 AM    MDMA NOT REPORTED 01/21/2020 10:30 AM    BUPRENUR NEGATIVE 01/21/2020 10:30 AM    LABTEST NOT REPORTED 01/21/2020 10:30 AM       Lab Results   Component Value Date/Time    MG 1.8 10/14/2019 03:05 PM       No results found for: KATHY, MPO, PR3, C5, HEPCAB    IMAGING:    No results found. DIAGNOSIS:  Principal Problem:    Cervicalgia  Active Problems:    Traumatic arthropathy of left wrist    Essential hypertension    Chronic pain    Type 2 diabetes mellitus without complication, without long-term current use of insulin ([facility])    Allergic rhinitis  Resolved Problems:    * No resolved hospital problems.  *      TREATMENT   Meditation routine was discussed  Breathing exercises and how to do them was discussed  Counseling for chronic pain was done  We did some microcurrent acupuncture on the trigger spots  With electric time was 15 minutes  Ill effects of being on chronic pain medications such as sleep disturbances, hormonal changes, withdrawal symptoms,  chronic opioid dependence and tolerance were discussed with patient. I had asked the patient to minimize medication use and utilize pain medications only for uncontrolled rest pain or pain with exertional activities. I advised patient not to self escalate pain medications without consulting with us. At each of patient's future visits we will try to taper pain medications, while adjusting the adjunct medications, and re-evaluating for Physical Therapy to improve spinal and joint strength. We will continue to have discussions to decrease pain medications as tolerated. I also discussed with the patient regarding the dangers of combining narcotic pain medication with tranquilizers, alcohol or illegal drugs or taking the medication any other than prescribed. The dangers including the respiratory depression and death. Patient was told to tell  to all  physicians regarding the medications he is getting from pain clinic. Patient is warned not to take any unprescribed medications over-the-counter medications that can depress breathing . Patient is advised to talk to the pharmacist or physicians if planning to take any over-the-counter medications before  takeing them. Patient is strongly advised to avoid tranquilizers or  Relaxants for any medications that can depress breathing or recreational drugs. Patient is also advised to tell us if there is any changes in his medications from other physicians. We discussed the same at today's visit .         Exercises were shown and how to do them was discussed      Time in   :930  Time out :10.05  Consult   :15  Acupuncture:15  Next visit     :          CHRONIC PAIN MANAGEMENT PATIENT  Pain Agreement Signed: yRisk and benefits of opioid have been discussed with the patient.y  Acupuncture Consent Signed: yNon-opioid trials were inappropriate and provided insufficient pain relief   OARRS Attested: y    (Please note that portions of this note were completed with a voice recognition program.  Efforts were made to edit the dictations but occasionallywords are mis-transcribed.)    Electronically signed by Madalyn Oakes MD on 8/25/2020 at 9:57 AM

## 2020-08-25 NOTE — PROGRESS NOTES
PAIN   Patient Name:  Chandan Rubio  :   1950  Clinic Visit Date: 2020      REVIEW OF SYSTEMS    Constitutional Weight changes: absent, change in appetite: absent Fatigue: absent; Fevers : absent, Any recent hospitalizations:  absent   HEENT Ears: normal,  Visual disturbance: absent   Reespiratory Shortness of breath: absent, choking:  absent, Cough: present, Snoring : absent   Cardivascular Chest pain: absent, Leg swelling :absent, palpitations : absent, fainting : absent   GI Constipation: absent, Diarrhea: absent, Swallowing change: absent    Urinary frequency: absent, Urinary urgency: absent, Urinary incontinence: absent   Musculoskeletal Neck pain: present, Back pain: present, Stiffness: present, Muscle pain: present, Joint pain: present, restless leg : absent   Dermatological Hair loss: absent, Skin changes: absent   Neurological Confusion: absent, Trouble concentrating: absent, Seizures: absent;  Memory loss: absent, balance problem: present, Dizziness: present, vertigo: absent, Weakness: absent, Numbness absent, Tremor: absent, Spasm: absent, involuntary movement: absent, Speech difficulty: absent, Headache: absent, Light sensitivity: absent   Psychiatric Anxiety: present, Depression  present, drug abuse: absent, Hallucination: absent, mood disorder: absent, Suicidal ideations absent   Hematologic Abnormal bleeding: absent, Anemia: absent, Lymph gland changes: absent Clotting disorder: absent

## 2020-09-09 RX ORDER — BUPRENORPHINE 5 UG/H
1 PATCH TRANSDERMAL WEEKLY
Qty: 4 PATCH | Refills: 2 | Status: CANCELLED | OUTPATIENT
Start: 2020-09-09 | End: 2020-12-02

## 2020-09-15 RX ORDER — BUPRENORPHINE 5 UG/H
1 PATCH TRANSDERMAL WEEKLY
Qty: 4 PATCH | Refills: 2 | OUTPATIENT
Start: 2020-09-15 | End: 2020-12-08

## 2020-09-15 RX ORDER — BUPRENORPHINE 5 UG/H
1 PATCH TRANSDERMAL WEEKLY
Qty: 4 PATCH | Refills: 2 | Status: SHIPPED | OUTPATIENT
Start: 2020-09-15 | End: 2020-12-08

## 2020-09-22 ENCOUNTER — HOSPITAL ENCOUNTER (OUTPATIENT)
Age: 70
Discharge: HOME | End: 2020-09-22
Payer: MEDICARE

## 2020-09-22 LAB
ANION GAP SERPL CALCULATED.3IONS-SCNC: 13 MMOL/L (ref 9–17)
BUN BLDV-MCNC: 19 MG/DL (ref 8–23)
BUN/CREAT BLD: 19 (ref 9–20)
CALCIUM SERPL-MCNC: 9.2 MG/DL (ref 8.6–10.4)
CHLORIDE BLD-SCNC: 96 MMOL/L (ref 98–107)
CHOLESTEROL, FASTING: 183 MG/DL
CHOLESTEROL/HDL RATIO: 2.6
CO2: 28 MMOL/L (ref 20–31)
CREAT SERPL-MCNC: 0.98 MG/DL (ref 0.5–0.9)
CREATININE URINE: 138.9 MG/DL (ref 28–217)
ESTIMATED AVERAGE GLUCOSE: 157 MG/DL
GFR AFRICAN AMERICAN: >60 ML/MIN
GFR NON-AFRICAN AMERICAN: 56 ML/MIN
GFR SERPL CREATININE-BSD FRML MDRD: ABNORMAL ML/MIN/{1.73_M2}
GFR SERPL CREATININE-BSD FRML MDRD: ABNORMAL ML/MIN/{1.73_M2}
GLUCOSE FASTING: 162 MG/DL (ref 70–99)
HBA1C MFR BLD: 7.1 % (ref 4–6)
HDLC SERPL-MCNC: 71 MG/DL
LDL CHOLESTEROL: 84 MG/DL (ref 0–130)
MICROALBUMIN/CREAT 24H UR: 33 MG/L
MICROALBUMIN/CREAT UR-RTO: 24 MCG/MG CREAT
POTASSIUM SERPL-SCNC: 3.3 MMOL/L (ref 3.7–5.3)
SODIUM BLD-SCNC: 137 MMOL/L (ref 135–144)
TRIGLYCERIDE, FASTING: 139 MG/DL
VLDLC SERPL CALC-MCNC: NORMAL MG/DL (ref 1–30)

## 2020-09-22 PROCEDURE — 83036 HEMOGLOBIN GLYCOSYLATED A1C: CPT

## 2020-09-22 PROCEDURE — 36415 COLL VENOUS BLD VENIPUNCTURE: CPT

## 2020-09-22 PROCEDURE — 80061 LIPID PANEL: CPT

## 2020-09-22 PROCEDURE — 82570 ASSAY OF URINE CREATININE: CPT

## 2020-09-22 PROCEDURE — 82043 UR ALBUMIN QUANTITATIVE: CPT

## 2020-09-22 PROCEDURE — 80048 BASIC METABOLIC PNL TOTAL CA: CPT

## 2020-12-08 ENCOUNTER — HOSPITAL ENCOUNTER (OUTPATIENT)
Dept: PAIN MANAGEMENT | Age: 70
Discharge: HOME | End: 2020-12-08
Payer: MEDICARE

## 2020-12-08 VITALS
WEIGHT: 166.4 LBS | DIASTOLIC BLOOD PRESSURE: 79 MMHG | HEART RATE: 82 BPM | SYSTOLIC BLOOD PRESSURE: 114 MMHG | BODY MASS INDEX: 27.72 KG/M2 | RESPIRATION RATE: 18 BRPM | HEIGHT: 65 IN | TEMPERATURE: 97.1 F

## 2020-12-08 PROCEDURE — 97813 ACUP 1/> W/ESTIM 1ST 15 MIN: CPT | Performed by: ANESTHESIOLOGY

## 2020-12-08 PROCEDURE — 99213 OFFICE O/P EST LOW 20 MIN: CPT | Performed by: ANESTHESIOLOGY

## 2020-12-08 PROCEDURE — 99214 OFFICE O/P EST MOD 30 MIN: CPT

## 2020-12-08 RX ORDER — BUPRENORPHINE 5 UG/H
1 PATCH TRANSDERMAL WEEKLY
Qty: 4 PATCH | Refills: 1 | Status: SHIPPED | OUTPATIENT
Start: 2021-01-05 | End: 2021-03-02 | Stop reason: ALTCHOICE

## 2020-12-08 ASSESSMENT — PAIN DESCRIPTION - ORIENTATION: ORIENTATION: LEFT

## 2020-12-08 ASSESSMENT — PAIN DESCRIPTION - LOCATION: LOCATION: ARM;FINGER (COMMENT WHICH ONE)

## 2020-12-08 ASSESSMENT — PAIN DESCRIPTION - DESCRIPTORS: DESCRIPTORS: ACHING;BURNING;THROBBING

## 2020-12-08 ASSESSMENT — PAIN - FUNCTIONAL ASSESSMENT: PAIN_FUNCTIONAL_ASSESSMENT: PREVENTS OR INTERFERES SOME ACTIVE ACTIVITIES AND ADLS

## 2020-12-08 ASSESSMENT — PAIN DESCRIPTION - ONSET: ONSET: ON-GOING

## 2020-12-08 ASSESSMENT — PAIN DESCRIPTION - PROGRESSION: CLINICAL_PROGRESSION: NOT CHANGED

## 2020-12-08 ASSESSMENT — PAIN DESCRIPTION - FREQUENCY: FREQUENCY: INTERMITTENT

## 2020-12-08 ASSESSMENT — PAIN DESCRIPTION - PAIN TYPE: TYPE: CHRONIC PAIN

## 2020-12-08 ASSESSMENT — PAIN SCALES - GENERAL: PAINLEVEL_OUTOF10: 5

## 2020-12-08 NOTE — PROGRESS NOTES
Pulmonary effort is normal.      Breath sounds: Normal breath sounds. Abdominal:      Palpations: Abdomen is soft. Musculoskeletal: Normal range of motion. Skin:     General: Skin is warm and dry. Neurological:      Mental Status: She is alert and oriented to person, place, and time. Psychiatric:         Behavior: Behavior normal.       Ortho Exam    LABS:    Lab Results   Component Value Date/Time    AMPHSUR NEGATIVE 01/21/2020 10:30 AM    BARBSCNU NEGATIVE 01/21/2020 10:30 AM    LABBENZ NEGATIVE 01/21/2020 10:30 AM    LABBENZ NEGATIVE 02/07/2013 09:30 AM    COCAINEMETUR NEGATIVE 01/21/2020 10:30 AM    LABMETH NEGATIVE 01/21/2020 10:30 AM    OPIAU NEGATIVE 01/21/2020 10:30 AM    PHENCYCU NEGATIVE 01/21/2020 10:30 AM    PPXUR NEGATIVE 01/21/2020 10:30 AM    CANSU NEGATIVE 01/21/2020 10:30 AM    OXTCOSU NEGATIVE 01/21/2020 10:30 AM    METAMPU NEGATIVE 01/21/2020 10:30 AM    TRICYUR NEGATIVE 01/21/2020 10:30 AM    MDMA NOT REPORTED 01/21/2020 10:30 AM    BUPRENUR NEGATIVE 01/21/2020 10:30 AM    LABTEST NOT REPORTED 01/21/2020 10:30 AM       Lab Results   Component Value Date/Time    MG 1.8 10/14/2019 03:05 PM       No results found for: KATHY, MPO, PR3, C5, HEPCAB    IMAGING:    No results found. DIAGNOSIS:  Principal Problem:    Cervicalgia  Active Problems:    Sprain of wrist    Traumatic arthropathy involving hand, right  Resolved Problems:    * No resolved hospital problems. *      TREATMENT   Meditation routine was discussed  Breathing exercises and how to do them was discussed  Counseling for chronic pain was done  Puncture was done for her back and upper trigger spot and then her prescriptions were refilled for the Voltaren and the Butrans  Ill effects of being on chronic pain medications such as sleep disturbances, hormonal changes, withdrawal symptoms,  chronic opioid dependence and tolerance were discussed with patient.  I had asked the patient to minimize medication use and utilize pain medications only for uncontrolled rest pain or pain with exertional activities. I advised patient not to self escalate pain medications without consulting with us. At each of patient's future visits we will try to taper pain medications, while adjusting the adjunct medications, and re-evaluating for Physical Therapy to improve spinal and joint strength. We will continue to have discussions to decrease pain medications as tolerated. I also discussed with the patient regarding the dangers of combining narcotic pain medication with tranquilizers, alcohol or illegal drugs or taking the medication any other than prescribed. The dangers including the respiratory depression and death. Patient was told to tell  to all  physicians regarding the medications he is getting from pain clinic. Patient is warned not to take any unprescribed medications over-the-counter medications that can depress breathing . Patient is advised to talk to the pharmacist or physicians if planning to take any over-the-counter medications before  takeing them. Patient is strongly advised to avoid tranquilizers or  Relaxants for any medications that can depress breathing or recreational drugs. Patient is also advised to tell us if there is any changes in his medications from other physicians. We discussed the same at today's visit . Exercises were shown and how to do them was discussed    Consult   :15  Acupuncture:15  Next visit     :12 wka          CHRONIC PAIN MANAGEMENT PATIENT  Pain Agreement Signed: yes  Risk and benefits of opioid have been discussed with the patient.   Acupuncture Consent Signed: yes  Non-opioid trials were inappropriate and provided insufficient pain relief   OARRS Attested: yes      (Please note that portions of this note were completed with a voice recognition program.  Efforts were made to edit the dictations but occasionallywords are mis-transcribed.)    Electronically signed by Radha Bloden MD on 12/8/2020 at 10:52 AM

## 2020-12-08 NOTE — PROGRESS NOTES
PAIN   Patient Name:  Radha Roberson  :   1950  Clinic Visit Date: 2020      REVIEW OF SYSTEMS    Constitutional Weight changes: absent, change in appetite: absent Fatigue: absent; Fevers : absent, Any recent hospitalizations:  absent   HEENT Ears: normal,  Visual disturbance: absent   Reespiratory Shortness of breath: absent, choking:  absent, Cough: absent, Snoring : absent   Cardivascular Chest pain: absent, Leg swelling :absent, palpitations : absent, fainting : absent   GI Constipation: absent, Diarrhea: absent, Swallowing change: absent    Urinary frequency: absent, Urinary urgency: absent, Urinary incontinence: absent   Musculoskeletal Neck pain: present, Back pain: present, Stiffness: absent, Muscle pain: absent, Joint pain: absent, restless leg : absent   Dermatological Hair loss: absent, Skin changes: absent   Neurological Confusion: absent, Trouble concentrating: absent, Seizures: absent;  Memory loss: absent, balance problem: absent, Dizziness: absent, vertigo: absent, Weakness: absent, Numbness present, Tremor: absent, Spasm: absent, involuntary movement: absent, Speech difficulty: absent, Headache: absent, Light sensitivity: absent   Psychiatric Anxiety: present, Depression  present, drug abuse: absent, Hallucination: absent, mood disorder: absent, Suicidal ideations absent   Hematologic Abnormal bleeding: absent, Anemia: absent, Lymph gland changes: absent Clotting disorder: absent

## 2021-03-02 ENCOUNTER — HOSPITAL ENCOUNTER (OUTPATIENT)
Dept: PAIN MANAGEMENT | Age: 71
Discharge: HOME | End: 2021-03-02
Payer: MEDICARE

## 2021-03-02 ENCOUNTER — HOSPITAL ENCOUNTER (OUTPATIENT)
Age: 71
Discharge: HOME | End: 2021-03-02
Payer: MEDICARE

## 2021-03-02 VITALS
DIASTOLIC BLOOD PRESSURE: 71 MMHG | HEART RATE: 80 BPM | WEIGHT: 159.3 LBS | BODY MASS INDEX: 28.23 KG/M2 | TEMPERATURE: 96 F | RESPIRATION RATE: 16 BRPM | SYSTOLIC BLOOD PRESSURE: 141 MMHG | HEIGHT: 63 IN

## 2021-03-02 DIAGNOSIS — E55.9 VITAMIN D DEFICIENCY: Primary | ICD-10-CM

## 2021-03-02 DIAGNOSIS — E55.9 VITAMIN D DEFICIENCY: ICD-10-CM

## 2021-03-02 DIAGNOSIS — M54.2 CERVICALGIA: Chronic | ICD-10-CM

## 2021-03-02 LAB — VITAMIN D 25-HYDROXY: 79.5 NG/ML (ref 30–100)

## 2021-03-02 PROCEDURE — 99214 OFFICE O/P EST MOD 30 MIN: CPT | Performed by: ANESTHESIOLOGY

## 2021-03-02 PROCEDURE — 99214 OFFICE O/P EST MOD 30 MIN: CPT

## 2021-03-02 PROCEDURE — 82306 VITAMIN D 25 HYDROXY: CPT

## 2021-03-02 PROCEDURE — 36415 COLL VENOUS BLD VENIPUNCTURE: CPT

## 2021-03-02 RX ORDER — BUPRENORPHINE 5 UG/H
1 PATCH TRANSDERMAL WEEKLY
Qty: 4 PATCH | Refills: 2 | Status: SHIPPED | OUTPATIENT
Start: 2021-03-02 | End: 2021-06-08 | Stop reason: ALTCHOICE

## 2021-03-02 RX ORDER — DICLOFENAC SODIUM, ISOPROPYL ALCOHOL 1 %
1 KIT TOPICAL 3 TIMES DAILY
Qty: 2 KIT | Refills: 2 | Status: SHIPPED | OUTPATIENT
Start: 2021-03-02 | End: 2021-06-08 | Stop reason: ALTCHOICE

## 2021-03-02 ASSESSMENT — PAIN SCALES - GENERAL: PAINLEVEL_OUTOF10: 5

## 2021-03-02 ASSESSMENT — PAIN DESCRIPTION - LOCATION: LOCATION: ARM

## 2021-03-02 ASSESSMENT — PAIN DESCRIPTION - ORIENTATION: ORIENTATION: LEFT

## 2021-03-02 ASSESSMENT — PAIN DESCRIPTION - PROGRESSION: CLINICAL_PROGRESSION: NOT CHANGED

## 2021-03-02 ASSESSMENT — PAIN - FUNCTIONAL ASSESSMENT: PAIN_FUNCTIONAL_ASSESSMENT: ACTIVITIES ARE NOT PREVENTED

## 2021-03-02 ASSESSMENT — PAIN DESCRIPTION - DESCRIPTORS: DESCRIPTORS: DULL

## 2021-03-02 NOTE — PROGRESS NOTES
PROGRESS:  Mynor Archibald is doing pretty good on her Butrans patches and Voltaren gel she is also taking her magnesium and vitamin D but she still has pain in her neck and hand which is okay with the patches but she says she gets a lot of generalized pain lot of time so I am going to order a vitamin D level to see if she is deficient in that otherwise her   opioid risk is 11 and   her MED is 9 and   she is doing very good without any complications   so we will continue the same and renew the prescription for total of 3 months with 2 refills and the Voltaren gel also we will order vitamin D if it is low    Current Pain Assessment  Pain Assessment  Pain Assessment: 0-10  Pain Level: 5  Patient's Stated Pain Goal: 3  Pain Type: Chronic pain  Pain Location: Arm  Pain Orientation: Left  Pain Descriptors: Dull  Pain Frequency: Intermittent  Pain Onset: On-going  Clinical Progression: Not changed  Functional Pain Assessment: Activities are not prevented  Multiple Pain Sites: No       ADVERSE MEDICATION EFFECTS:   Nausea and vomiting: no   Constipation:   no                  Dizziness/drowsy/sleepy-no  Urinary Retention: no    ACTIVITY/SOCIAL/EMOTIONAL:  Sleep Pattern:6 hrs  Home Exercises: daily  Activity:unchanged  Emotional Issues: normal.     ABERRANT BEHAVIORS SINCE LAST VISIT  Lost rx/pills:------------------------------------------ no  Taking  medication as prescribed: -----------yes  Urine Drug Screen --------------------------------- yes  Recent ER visits: ------------------------------------no  Pill count is appropriate: ---------------------------yes   Refills for prescriptions appropriate:----------yes  Physical Exam  Constitutional:       General: She is not in acute distress. Appearance: She is not diaphoretic. HENT:      Head: Normocephalic. Right Ear: External ear normal.      Left Ear: External ear normal.      Nose: Nose normal.   Eyes:      Pupils: Pupils are equal, round, and reactive to light. Neck:      Musculoskeletal: Normal range of motion. Neck rigidity present. Cardiovascular:      Rate and Rhythm: Normal rate. Heart sounds: Normal heart sounds. Pulmonary:      Effort: Pulmonary effort is normal.      Breath sounds: Normal breath sounds. Abdominal:      Palpations: Abdomen is soft. Musculoskeletal: Normal range of motion. Skin:     General: Skin is warm and dry. Neurological:      Mental Status: She is alert and oriented to person, place, and time. Psychiatric:         Behavior: Behavior normal.           LABS:    Lab Results   Component Value Date/Time    AMPHSUR NEGATIVE 01/21/2020 10:30 AM    BARBSCNU NEGATIVE 01/21/2020 10:30 AM    LABBENZ NEGATIVE 01/21/2020 10:30 AM    LABBENZ NEGATIVE 02/07/2013 09:30 AM    COCAINEMETUR NEGATIVE 01/21/2020 10:30 AM    LABMETH NEGATIVE 01/21/2020 10:30 AM    OPIAU NEGATIVE 01/21/2020 10:30 AM    PHENCYCU NEGATIVE 01/21/2020 10:30 AM    PPXUR NEGATIVE 01/21/2020 10:30 AM    CANSU NEGATIVE 01/21/2020 10:30 AM    OXTCOSU NEGATIVE 01/21/2020 10:30 AM    METAMPU NEGATIVE 01/21/2020 10:30 AM    TRICYUR NEGATIVE 01/21/2020 10:30 AM    MDMA NOT REPORTED 01/21/2020 10:30 AM    BUPRENUR NEGATIVE 01/21/2020 10:30 AM    LABTEST NOT REPORTED 01/21/2020 10:30 AM       Lab Results   Component Value Date/Time    MG 1.8 10/14/2019 03:05 PM       No results found for: KATHY, MPO, PR3, C5, HEPCAB    IMAGING:    No results found. DIAGNOSIS:  Principal Problem:    Cervicalgia  Active Problems:    Traumatic arthropathy involving hand, right    Essential hypertension    Type 2 diabetes mellitus without complication, without long-term current use of insulin ([facility])    Vitamin D deficiency  Resolved Problems:    * No resolved hospital problems.  *      TREATMENT   Meditation routine was discussed  Breathing exercises and how to do them was discussed  Counseling for chronic pain was done  Ill effects of being on chronic pain medications such as sleep disturbances, hormonal changes, withdrawal symptoms,  chronic opioid dependence and tolerance were discussed with patient. I had asked the patient to minimize medication use and utilize pain medications only for uncontrolled rest pain or pain with exertional activities. I advised patient not to self escalate pain medications without consulting with us. At each of patient's future visits we will try to taper pain medications, while adjusting the adjunct medications, and re-evaluating for Physical Therapy to improve spinal and joint strength. We will continue to have discussions to decrease pain medications as tolerated. I also discussed with the patient regarding the dangers of combining narcotic pain medication with tranquilizers, alcohol or illegal drugs or taking the medication any other than prescribed. The dangers including the respiratory depression and death. Patient was told to tell  to all  physicians regarding the medications he is getting from pain clinic. Patient is warned not to take any unprescribed medications over-the-counter medications that can depress breathing . Patient is advised to talk to the pharmacist or physicians if planning to take any over-the-counter medications before  takeing them. Patient is strongly advised to avoid tranquilizers or  Relaxants for any medications that can depress breathing or recreational drugs. Patient is also advised to tell us if there is any changes in his medications from other physicians. We discussed the same at today's visit . Prescribed her Butrans patches for 3 months  Did the Voltaren gel  And had vitamin D levels done if they are low then we will prescribe her vitamin D    Exercises were shown and how to do them was discussed     :          CHRONIC PAIN MANAGEMENT PATIENT  Pain Agreement Signed: yes  Risk and benefits of opioid have been discussed with the patient.   Acupuncture Consent Signed: yes  Non-opioid trials were inappropriate and provided insufficient

## 2021-03-02 NOTE — PROGRESS NOTES
PAIN   Patient Name:  Nick Beck  :   1950  Clinic Visit Date: 3/2/2021      REVIEW OF SYSTEMS    Constitutional Weight changes: absent, change in appetite: absent Fatigue: present; Fevers : absent, Any recent hospitalizations:  absent   HEENT Ears: normal,  Visual disturbance: absent   Reespiratory Shortness of breath: absent, choking:  absent, Cough: absent, Snoring : absent   Cardivascular Chest pain: absent, Leg swelling :absent, palpitations : absent, fainting : absent   GI Constipation: absent, Diarrhea: present, Swallowing change: absent    Urinary frequency: absent, Urinary urgency: present, Urinary incontinence: absent   Musculoskeletal Neck pain: present, Back pain: present, Stiffness: present, Muscle pain: present, Joint pain: present, restless leg : present   Dermatological Hair loss: absent, Skin changes: absent   Neurological Confusion: absent, Trouble concentrating: absent, Seizures: absent;  Memory loss: absent, balance problem: absent, Dizziness: present, vertigo: present, Weakness: absent, Numbness present, Tremor: absent, Spasm: absent, involuntary movement: absent, Speech difficulty: absent, Headache: absent, Light sensitivity: present   Psychiatric Anxiety: present, Depression  present, drug abuse: absent, Hallucination: absent, mood disorder: absent, Suicidal ideations absent   Hematologic Abnormal bleeding: absent, Anemia: absent, Lymph gland changes: absent Clotting disorder: absent

## 2021-04-14 ENCOUNTER — HOSPITAL ENCOUNTER (OUTPATIENT)
Dept: WOMENS IMAGING | Age: 71
Discharge: HOME | End: 2021-04-16
Payer: MEDICARE

## 2021-04-14 DIAGNOSIS — Z78.0 POSTMENOPAUSAL: ICD-10-CM

## 2021-04-14 PROCEDURE — 77080 DXA BONE DENSITY AXIAL: CPT

## 2021-04-15 ENCOUNTER — HOSPITAL ENCOUNTER (OUTPATIENT)
Age: 71
Discharge: HOME | End: 2021-04-15
Payer: MEDICARE

## 2021-04-15 DIAGNOSIS — E11.9 TYPE 2 DIABETES MELLITUS WITHOUT COMPLICATION, WITHOUT LONG-TERM CURRENT USE OF INSULIN (HCC): ICD-10-CM

## 2021-04-15 DIAGNOSIS — E78.2 MIXED HYPERLIPIDEMIA: ICD-10-CM

## 2021-04-15 LAB
ALBUMIN SERPL-MCNC: 3.9 G/DL (ref 3.5–5.2)
ALBUMIN/GLOBULIN RATIO: 1.3 (ref 1–2.5)
ALP BLD-CCNC: 65 U/L (ref 35–104)
ALT SERPL-CCNC: 15 U/L (ref 5–33)
ANION GAP SERPL CALCULATED.3IONS-SCNC: 10 MMOL/L (ref 9–17)
AST SERPL-CCNC: 18 U/L
BILIRUB SERPL-MCNC: 0.5 MG/DL (ref 0.3–1.2)
BUN BLDV-MCNC: 17 MG/DL (ref 8–23)
BUN/CREAT BLD: 21 (ref 9–20)
CALCIUM SERPL-MCNC: 9 MG/DL (ref 8.6–10.4)
CHLORIDE BLD-SCNC: 98 MMOL/L (ref 98–107)
CHOLESTEROL, FASTING: 182 MG/DL
CHOLESTEROL/HDL RATIO: 3
CO2: 29 MMOL/L (ref 20–31)
CREAT SERPL-MCNC: 0.8 MG/DL (ref 0.5–0.9)
CREATININE URINE: 134.4 MG/DL (ref 28–217)
GFR AFRICAN AMERICAN: >60 ML/MIN
GFR NON-AFRICAN AMERICAN: >60 ML/MIN
GFR SERPL CREATININE-BSD FRML MDRD: ABNORMAL ML/MIN/{1.73_M2}
GFR SERPL CREATININE-BSD FRML MDRD: ABNORMAL ML/MIN/{1.73_M2}
GLUCOSE FASTING: 167 MG/DL (ref 70–99)
HDLC SERPL-MCNC: 60 MG/DL
LDL CHOLESTEROL: 95 MG/DL (ref 0–130)
MICROALBUMIN/CREAT 24H UR: 12 MG/L
MICROALBUMIN/CREAT UR-RTO: 9 MCG/MG CREAT
POTASSIUM SERPL-SCNC: 3.4 MMOL/L (ref 3.7–5.3)
SODIUM BLD-SCNC: 137 MMOL/L (ref 135–144)
TOTAL PROTEIN: 6.9 G/DL (ref 6.4–8.3)
TRIGLYCERIDE, FASTING: 133 MG/DL
VLDLC SERPL CALC-MCNC: NORMAL MG/DL (ref 1–30)

## 2021-04-15 PROCEDURE — 82043 UR ALBUMIN QUANTITATIVE: CPT

## 2021-04-15 PROCEDURE — 36415 COLL VENOUS BLD VENIPUNCTURE: CPT

## 2021-04-15 PROCEDURE — 80053 COMPREHEN METABOLIC PANEL: CPT

## 2021-04-15 PROCEDURE — 83036 HEMOGLOBIN GLYCOSYLATED A1C: CPT

## 2021-04-15 PROCEDURE — 82570 ASSAY OF URINE CREATININE: CPT

## 2021-04-15 PROCEDURE — 80061 LIPID PANEL: CPT

## 2021-04-16 LAB
ESTIMATED AVERAGE GLUCOSE: 154 MG/DL
HBA1C MFR BLD: 7 % (ref 4–6)

## 2021-06-08 ENCOUNTER — HOSPITAL ENCOUNTER (OUTPATIENT)
Dept: PAIN MANAGEMENT | Age: 71
Discharge: HOME | End: 2021-06-08
Payer: MEDICARE

## 2021-06-08 VITALS
TEMPERATURE: 97.1 F | WEIGHT: 163 LBS | RESPIRATION RATE: 18 BRPM | SYSTOLIC BLOOD PRESSURE: 134 MMHG | DIASTOLIC BLOOD PRESSURE: 68 MMHG | HEART RATE: 75 BPM | BODY MASS INDEX: 27.16 KG/M2 | HEIGHT: 65 IN

## 2021-06-08 DIAGNOSIS — M54.2 CERVICALGIA: Primary | ICD-10-CM

## 2021-06-08 PROCEDURE — 99213 OFFICE O/P EST LOW 20 MIN: CPT | Performed by: ANESTHESIOLOGY

## 2021-06-08 PROCEDURE — 99214 OFFICE O/P EST MOD 30 MIN: CPT

## 2021-06-08 RX ORDER — DICLOFENAC SODIUM, ISOPROPYL ALCOHOL 1 %
1 KIT TOPICAL 3 TIMES DAILY
Qty: 1 KIT | Refills: 1 | Status: SHIPPED | OUTPATIENT
Start: 2021-06-08 | End: 2021-08-24 | Stop reason: SDUPTHER

## 2021-06-08 RX ORDER — BUPRENORPHINE 5 UG/H
1 PATCH TRANSDERMAL WEEKLY
Qty: 4 PATCH | Refills: 1 | Status: SHIPPED | OUTPATIENT
Start: 2021-06-28 | End: 2021-08-24 | Stop reason: SDUPTHER

## 2021-06-08 ASSESSMENT — PAIN DESCRIPTION - ONSET: ONSET: ON-GOING

## 2021-06-08 ASSESSMENT — PAIN SCALES - GENERAL: PAINLEVEL_OUTOF10: 4

## 2021-06-08 ASSESSMENT — PAIN DESCRIPTION - LOCATION: LOCATION: ARM;NECK

## 2021-06-08 ASSESSMENT — PAIN - FUNCTIONAL ASSESSMENT: PAIN_FUNCTIONAL_ASSESSMENT: PREVENTS OR INTERFERES SOME ACTIVE ACTIVITIES AND ADLS

## 2021-06-08 ASSESSMENT — PAIN DESCRIPTION - PAIN TYPE: TYPE: CHRONIC PAIN

## 2021-06-08 ASSESSMENT — PAIN DESCRIPTION - ORIENTATION: ORIENTATION: LEFT

## 2021-06-08 ASSESSMENT — PAIN DESCRIPTION - FREQUENCY: FREQUENCY: INTERMITTENT

## 2021-06-08 ASSESSMENT — PAIN DESCRIPTION - DESCRIPTORS: DESCRIPTORS: ACHING;BURNING;THROBBING

## 2021-06-08 ASSESSMENT — PAIN DESCRIPTION - PROGRESSION: CLINICAL_PROGRESSION: NOT CHANGED

## 2021-06-08 NOTE — PROGRESS NOTES
PAIN   Patient Name:  Tam Wisdom  :   1950  Clinic Visit Date: 2021      REVIEW OF SYSTEMS    Constitutional Weight changes: absent, change in appetite: absent Fatigue: absent; Fevers : absent, Any recent hospitalizations:  absent   HEENT Ears: diminished,  Visual disturbance: absent   Reespiratory Shortness of breath: absent, choking:  absent, Cough: absent, Snoring : absent   Cardivascular Chest pain: absent, Leg swelling :absent, palpitations : absent, fainting : absent   GI Constipation: absent, Diarrhea: absent, Swallowing change: absent    Urinary frequency: absent, Urinary urgency: absent, Urinary incontinence: absent   Musculoskeletal Neck pain: absent, Back pain: absent, Stiffness: absent, Muscle pain: absent, Joint pain: absent, restless leg : present   Dermatological Hair loss: absent, Skin changes: absent   Neurological Confusion: absent, Trouble concentrating: absent, Seizures: absent;  Memory loss: absent, balance problem: absent, Dizziness: absent, vertigo: absent, Weakness: absent, Numbness absent, Tremor: absent, Spasm: absent, involuntary movement: absent, Speech difficulty: absent, Headache: absent, Light sensitivity: absent   Psychiatric Anxiety: absent, Depression  absent, drug abuse: absent, Hallucination: absent, mood disorder: absent, Suicidal ideations absent   Hematologic Abnormal bleeding: absent, Anemia: absent, Lymph gland changes: absent Clotting disorder: absent

## 2021-06-08 NOTE — PROGRESS NOTES
PROGRESS:  Moi Garrett is doing good except sometimes the weather kind of gets her and then she gets the pain in her neck otherwise she is doing good on the medication she does complain of pain and cramps in her legs which I think are from the simvastatin we discussed the diet again we discussed it before also and when she gets on her diet we can start reducing the simvastatin to see if we can control her with diet instead of the medicine which is causing her side effects  Current Pain Assessment  Pain Assessment  Pain Assessment: 0-10  Pain Level: 4  Patient's Stated Pain Goal: 1  Pain Type: Chronic pain  Pain Location: Arm, Neck  Pain Orientation: Left  Pain Descriptors: Aching, Burning, Throbbing  Pain Frequency: Intermittent  Pain Onset: On-going  Clinical Progression: Not changed  Functional Pain Assessment: Prevents or interferes some active activities and ADLs       ADVERSE MEDICATION EFFECTS:   Nausea and vomiting: no   Constipation:   no                  Dizziness/drowsy/sleepy-no  Urinary Retention: no    ACTIVITY/SOCIAL/EMOTIONAL:  Sleep Pattern:6 hrs  Home Exercises: daily  Activity:increased  Emotional Issues: normal.     ABERRANT BEHAVIORS SINCE LAST VISIT  Lost rx/pills:------------------------------------------ no  Taking  medication as prescribed: -----------yes  Urine Drug Screen --------------------------------- yes  Recent ER visits: ------------------------------------no  Pill count is appropriate: ---------------------------yes   Refills for prescriptions appropriate:----------yes  Physical Exam  Ortho Exam    LABS:    Lab Results   Component Value Date/Time    AMPHSUR NEGATIVE 01/21/2020 10:30 AM    BARBSCNU NEGATIVE 01/21/2020 10:30 AM    LABBENZ NEGATIVE 01/21/2020 10:30 AM    LABBENZ NEGATIVE 02/07/2013 09:30 AM    COCAINEMETUR NEGATIVE 01/21/2020 10:30 AM    LABMETH NEGATIVE 01/21/2020 10:30 AM    OPIAU NEGATIVE 01/21/2020 10:30 AM    PHENCYCU NEGATIVE 01/21/2020 10:30 AM    PPXUR NEGATIVE 01/21/2020 10:30 AM    CANSU NEGATIVE 01/21/2020 10:30 AM    OXTCOSU NEGATIVE 01/21/2020 10:30 AM    METAMPU NEGATIVE 01/21/2020 10:30 AM    TRICYUR NEGATIVE 01/21/2020 10:30 AM    MDMA NOT REPORTED 01/21/2020 10:30 AM    BUPRENUR NEGATIVE 01/21/2020 10:30 AM    LABTEST NOT REPORTED 01/21/2020 10:30 AM       Lab Results   Component Value Date/Time    MG 1.8 10/14/2019 03:05 PM    VITD25 79.5 03/02/2021 10:31 AM       No results found for: KATHY, MPO, PR3, C5, HEPCAB    IMAGING:    No results found. DIAGNOSIS:  Principal Problem:    Cervicalgia  Active Problems:    Chronic pain    Type 2 diabetes mellitus without complication, without long-term current use of insulin ([facility])  Resolved Problems:    * No resolved hospital problems. *      TREATMENT   Meditation routine was discussed  Breathing exercises and how to do them was discussed  Counseling for chronic pain was done  Butrans patch with 1 refill was prescribed for now she has it till the end of August    Ill effects of being on chronic pain medications such as sleep disturbances, hormonal changes, withdrawal symptoms,  chronic opioid dependence and tolerance were discussed with patient. I had asked the patient to minimize medication use and utilize pain medications only for uncontrolled rest pain or pain with exertional activities. I advised patient not to self escalate pain medications without consulting with us. At each of patient's future visits we will try to taper pain medications, while adjusting the adjunct medications, and re-evaluating for Physical Therapy to improve spinal and joint strength. We will continue to have discussions to decrease pain medications as tolerated. I also discussed with the patient regarding the dangers of combining narcotic pain medication with tranquilizers, alcohol or illegal drugs or taking the medication any other than prescribed. The dangers including the respiratory depression and death.  Patient was told to tell  to all physicians regarding the medications he is getting from pain clinic. Patient is warned not to take any unprescribed medications over-the-counter medications that can depress breathing . Patient is advised to talk to the pharmacist or physicians if planning to take any over-the-counter medications before  takeing them. Patient is strongly advised to avoid tranquilizers or  Relaxants for any medications that can depress breathing or recreational drugs. Patient is also advised to tell us if there is any changes in his medications from other physicians. We discussed the same at today's visit . Exercises were shown and how to do them was discussed    Consult   :  Acupuncture:  Next visit     :          CHRONIC PAIN MANAGEMENT PATIENT  Pain Agreement Signed: yes  Risk and benefits of opioid have been discussed with the patient.   Acupuncture Consent Signed: yes  Non-opioid trials were inappropriate and provided insufficient pain relief   OARRS Attested: yes      (Please note that portions of this note were completed with a voice recognition program.  Efforts were made to edit the dictations but occasionallywords are mis-transcribed.)    Electronically signed by Maame Salazar MD on 6/8/2021 at 9:34 AM

## 2021-08-03 PROBLEM — S00.03XA SCALP HEMATOMA, INITIAL ENCOUNTER: Status: RESOLVED | Noted: 2019-10-14 | Resolved: 2021-08-03

## 2021-08-04 ENCOUNTER — HOSPITAL ENCOUNTER (OUTPATIENT)
Age: 71
Discharge: HOME | End: 2021-08-06
Payer: MEDICARE

## 2021-08-04 ENCOUNTER — HOSPITAL ENCOUNTER (OUTPATIENT)
Dept: GENERAL RADIOLOGY | Age: 71
Discharge: HOME | End: 2021-08-06
Payer: MEDICARE

## 2021-08-04 DIAGNOSIS — M79.605 LEFT LEG PAIN: ICD-10-CM

## 2021-08-04 PROCEDURE — 73562 X-RAY EXAM OF KNEE 3: CPT

## 2021-08-24 ENCOUNTER — HOSPITAL ENCOUNTER (OUTPATIENT)
Dept: PAIN MANAGEMENT | Age: 71
Discharge: HOME | End: 2021-08-24
Payer: MEDICARE

## 2021-08-24 VITALS
SYSTOLIC BLOOD PRESSURE: 127 MMHG | BODY MASS INDEX: 26.89 KG/M2 | RESPIRATION RATE: 18 BRPM | TEMPERATURE: 97.1 F | HEIGHT: 65 IN | HEART RATE: 88 BPM | WEIGHT: 161.4 LBS | DIASTOLIC BLOOD PRESSURE: 73 MMHG

## 2021-08-24 DIAGNOSIS — M54.2 CERVICALGIA: ICD-10-CM

## 2021-08-24 PROCEDURE — 99214 OFFICE O/P EST MOD 30 MIN: CPT

## 2021-08-24 PROCEDURE — 99213 OFFICE O/P EST LOW 20 MIN: CPT | Performed by: ANESTHESIOLOGY

## 2021-08-24 RX ORDER — DICLOFENAC SODIUM, ISOPROPYL ALCOHOL 1 %
1 KIT TOPICAL 3 TIMES DAILY
Qty: 1 KIT | Refills: 1 | Status: SHIPPED | OUTPATIENT
Start: 2021-08-24 | End: 2021-11-16 | Stop reason: SDUPTHER

## 2021-08-24 RX ORDER — BUPRENORPHINE 5 UG/H
1 PATCH TRANSDERMAL WEEKLY
Qty: 4 PATCH | Refills: 2 | Status: SHIPPED | OUTPATIENT
Start: 2021-09-03 | End: 2021-11-16 | Stop reason: SDUPTHER

## 2021-08-24 ASSESSMENT — PAIN DESCRIPTION - FREQUENCY: FREQUENCY: INTERMITTENT

## 2021-08-24 ASSESSMENT — PAIN DESCRIPTION - ONSET: ONSET: ON-GOING

## 2021-08-24 ASSESSMENT — PAIN SCALES - GENERAL: PAINLEVEL_OUTOF10: 6

## 2021-08-24 ASSESSMENT — PAIN DESCRIPTION - DESCRIPTORS: DESCRIPTORS: ACHING;BURNING;THROBBING

## 2021-08-24 ASSESSMENT — PAIN - FUNCTIONAL ASSESSMENT: PAIN_FUNCTIONAL_ASSESSMENT: ACTIVITIES ARE NOT PREVENTED

## 2021-08-24 ASSESSMENT — PAIN DESCRIPTION - PROGRESSION: CLINICAL_PROGRESSION: NOT CHANGED

## 2021-08-24 ASSESSMENT — PAIN DESCRIPTION - LOCATION: LOCATION: ARM;WRIST

## 2021-08-24 ASSESSMENT — PAIN DESCRIPTION - PAIN TYPE: TYPE: CHRONIC PAIN

## 2021-08-24 ASSESSMENT — PAIN DESCRIPTION - ORIENTATION: ORIENTATION: LEFT

## 2021-08-24 NOTE — PROGRESS NOTES
PAIN   Patient Name:  Lanette Darling  :   1950  Clinic Visit Date: 2021      REVIEW OF SYSTEMS    Constitutional Weight changes: absent, change in appetite: absent Fatigue: absent; Fevers : absent, Any recent hospitalizations:  absent   HEENT Ears: normal,  Visual disturbance: absent   Reespiratory Shortness of breath: absent, choking:  absent, Cough: absent, Snoring : absent   Cardivascular Chest pain: absent, Leg swelling :absent, palpitations : absent, fainting : absent   GI Constipation: absent, Diarrhea: absent, Swallowing change: absent    Urinary frequency: absent, Urinary urgency: absent, Urinary incontinence: absent   Musculoskeletal Neck pain: absent, Back pain: absent, Stiffness: absent, Muscle pain: absent, Joint pain: absent, restless leg : absent   Dermatological Hair loss: absent, Skin changes: absent   Neurological Confusion: absent, Trouble concentrating: absent, Seizures: absent;  Memory loss: absent, balance problem: absent, Dizziness: absent, vertigo: absent, Weakness: absent, Numbness absent, Tremor: absent, Spasm: absent, involuntary movement: absent, Speech difficulty: absent, Headache: absent, Light sensitivity: absent   Psychiatric Anxiety: absent, Depression  absent, drug abuse: absent, Hallucination: absent, mood disorder: absent, Suicidal ideations absent   Hematologic Abnormal bleeding: absent, Anemia: absent, Lymph gland changes: absent Clotting disorder: absent

## 2021-08-24 NOTE — PROGRESS NOTES
PROGRESS:  Her neck pain is improved quite a bit she still has pain in her left wrist but it has been better than before the pain at a 6 that she is describing is in her lower legs last time she told me it was a cramping now she is saying that it is more like a burning pain Dr. Delaney Carpenter has prescribed her gabapentin and she is feeling better on that so we will just leave that  We will continue her on the buprenorphine patch 5 mcg she has functionally improved a lot on that patch with her neck so I will just keep her at that much I have given her 2 refills this time    Current Pain Assessment  Pain Assessment  Pain Assessment: 0-10  Pain Level: 6  Patient's Stated Pain Goal: 1  Pain Type: Chronic pain  Pain Location: Arm, Wrist  Pain Orientation: Left  Pain Descriptors: Aching, Burning, Throbbing  Pain Frequency: Intermittent  Pain Onset: On-going  Clinical Progression: Not changed  Functional Pain Assessment: Activities are not prevented       ADVERSE MEDICATION EFFECTS:   Nausea and vomiting: no   Constipation:   no                  Dizziness/drowsy/sleepy-no  Urinary Retention: no    ACTIVITY/SOCIAL/EMOTIONAL:  Sleep Pattern:6 hrs  Home Exercises: daily  Activity:increased  Emotional Issues: normal.     ABERRANT BEHAVIORS SINCE LAST VISIT  Lost rx/pills:------------------------------------------ no  Taking  medication as prescribed: -----------yes  Urine Drug Screen --------------------------------- yes  Recent ER visits: ------------------------------------no  Pill count is appropriate: ---------------------------yes   Refills for prescriptions appropriate:----------yes  Physical Exam  Constitutional:       General: She is not in acute distress. Appearance: She is not diaphoretic. HENT:      Head: Normocephalic. Right Ear: External ear normal.      Left Ear: External ear normal.      Nose: Nose normal.   Eyes:      Pupils: Pupils are equal, round, and reactive to light.    Cardiovascular:      Rate and Rhythm: Normal rate. Heart sounds: Normal heart sounds. Pulmonary:      Effort: Pulmonary effort is normal.      Breath sounds: Normal breath sounds. Abdominal:      Palpations: Abdomen is soft. Musculoskeletal:         General: Tenderness present. Normal range of motion. Cervical back: Normal range of motion. Rigidity and tenderness present. Skin:     General: Skin is warm and dry. Neurological:      Mental Status: She is alert and oriented to person, place, and time. Psychiatric:         Behavior: Behavior normal.       Ortho Exam    LABS:    Lab Results   Component Value Date/Time    AMPHSUR NEGATIVE 01/21/2020 10:30 AM    BARBSCNU NEGATIVE 01/21/2020 10:30 AM    LABBENZ NEGATIVE 01/21/2020 10:30 AM    LABBENZ NEGATIVE 02/07/2013 09:30 AM    COCAINEMETUR NEGATIVE 01/21/2020 10:30 AM    LABMETH NEGATIVE 01/21/2020 10:30 AM    OPIAU NEGATIVE 01/21/2020 10:30 AM    PHENCYCU NEGATIVE 01/21/2020 10:30 AM    PPXUR NEGATIVE 01/21/2020 10:30 AM    CANSU NEGATIVE 01/21/2020 10:30 AM    OXTCOSU NEGATIVE 01/21/2020 10:30 AM    METAMPU NEGATIVE 01/21/2020 10:30 AM    TRICYUR NEGATIVE 01/21/2020 10:30 AM    MDMA NOT REPORTED 01/21/2020 10:30 AM    BUPRENUR NEGATIVE 01/21/2020 10:30 AM    LABTEST NOT REPORTED 01/21/2020 10:30 AM       Lab Results   Component Value Date/Time    MG 1.8 10/14/2019 03:05 PM    VITD25 79.5 03/02/2021 10:31 AM    TSH 0.1380 01/15/2021 12:00 AM       No results found for: KATHY, MPO, PR3, C5, HEPCAB    IMAGING:    XR KNEE LEFT (3 VIEWS)    Result Date: 8/4/2021  EXAMINATION: THREE XRAY VIEWS OF THE LEFT KNEE 8/4/2021 10:27 am COMPARISON: None. HISTORY: ORDERING SYSTEM PROVIDED HISTORY: Left leg pain 64-year-old female with left leg pain FINDINGS: Mild tricompartmental osteophyte spurring. No sizable joint effusion. Moderate degenerative changes of the patellofemoral and medial compartments. No suspicious osteolytic or osteoblastic lesions. No acute fracture or dislocation. Osseous alignment is normal.  Atherosclerotic calcification of the vasculature. 1. Tricompartmental osteoarthrosis. Moderate degenerative changes of the patellofemoral and medial compartments. 2. No acute fracture or dislocation. DIAGNOSIS:  Principal Problem:    Cervicalgia  Active Problems:    Chronic pain  Resolved Problems:    Sprain of left forearm      TREATMENT   Meditation routine was discussed  Breathing exercises and how to do them was discussed  Counseling for chronic pain was done  Ill effects of being on chronic pain medications such as sleep disturbances, hormonal changes, withdrawal symptoms,  chronic opioid dependence and tolerance were discussed with patient. I had asked the patient to minimize medication use and utilize pain medications only for uncontrolled rest pain or pain with exertional activities. I advised patient not to self escalate pain medications without consulting with us. At each of patient's future visits we will try to taper pain medications, while adjusting the adjunct medications, and re-evaluating for Physical Therapy to improve spinal and joint strength. We will continue to have discussions to decrease pain medications as tolerated. I also discussed with the patient regarding the dangers of combining narcotic pain medication with tranquilizers, alcohol or illegal drugs or taking the medication any other than prescribed. The dangers including the respiratory depression and death. Patient was told to tell  to all  physicians regarding the medications he is getting from pain clinic. Patient is warned not to take any unprescribed medications over-the-counter medications that can depress breathing . Patient is advised to talk to the pharmacist or physicians if planning to take any over-the-counter medications before  takeing them. Patient is strongly advised to avoid tranquilizers or  Relaxants for any medications that can depress breathing or recreational drugs. Patient is also advised to tell us if there is any changes in his medications from other physicians. We discussed the same at today's visit . Exercises were shown and how to do them was discussed    Consult   :  Acupuncture:  Next visit     :          CHRONIC PAIN MANAGEMENT PATIENT  Pain Agreement Signed: yes  Risk and benefits of opioid have been discussed with the patient.   Acupuncture Consent Signed: yes  Non-opioid trials were inappropriate and provided insufficient pain relief   OARRS Attested: yes      (Please note that portions of this note were completed with a voice recognition program.  Efforts were made to edit the dictations but occasionallywords are mis-transcribed.)    Electronically signed by Linda Lira MD on 8/24/2021 at 1:04 PM

## 2021-10-05 ENCOUNTER — HOSPITAL ENCOUNTER (OUTPATIENT)
Age: 71
Discharge: HOME | End: 2021-10-05
Payer: MEDICARE

## 2021-10-05 DIAGNOSIS — E11.9 TYPE 2 DIABETES MELLITUS WITHOUT COMPLICATION, WITHOUT LONG-TERM CURRENT USE OF INSULIN (HCC): ICD-10-CM

## 2021-10-05 LAB
ESTIMATED AVERAGE GLUCOSE: 146 MG/DL
HBA1C MFR BLD: 6.7 % (ref 4–6)

## 2021-10-05 PROCEDURE — 36415 COLL VENOUS BLD VENIPUNCTURE: CPT

## 2021-10-05 PROCEDURE — 83036 HEMOGLOBIN GLYCOSYLATED A1C: CPT

## 2021-10-07 ENCOUNTER — APPOINTMENT (OUTPATIENT)
Dept: CT IMAGING | Age: 71
End: 2021-10-07
Payer: MEDICARE

## 2021-10-07 ENCOUNTER — APPOINTMENT (OUTPATIENT)
Dept: GENERAL RADIOLOGY | Age: 71
End: 2021-10-07
Payer: MEDICARE

## 2021-10-07 ENCOUNTER — HOSPITAL ENCOUNTER (EMERGENCY)
Age: 71
Discharge: HOME | End: 2021-10-07
Attending: EMERGENCY MEDICINE
Payer: MEDICARE

## 2021-10-07 VITALS
DIASTOLIC BLOOD PRESSURE: 81 MMHG | WEIGHT: 162 LBS | TEMPERATURE: 97.9 F | SYSTOLIC BLOOD PRESSURE: 148 MMHG | RESPIRATION RATE: 18 BRPM | HEART RATE: 94 BPM | BODY MASS INDEX: 28.7 KG/M2 | HEIGHT: 63 IN | OXYGEN SATURATION: 98 %

## 2021-10-07 DIAGNOSIS — M54.2 NECK PAIN: ICD-10-CM

## 2021-10-07 DIAGNOSIS — S09.90XA CLOSED HEAD INJURY, INITIAL ENCOUNTER: ICD-10-CM

## 2021-10-07 DIAGNOSIS — M25.532 LEFT WRIST PAIN: ICD-10-CM

## 2021-10-07 DIAGNOSIS — M54.6 PAIN IN THORACIC SPINE: ICD-10-CM

## 2021-10-07 DIAGNOSIS — S09.90XA INJURY OF HEAD, INITIAL ENCOUNTER: Primary | ICD-10-CM

## 2021-10-07 PROCEDURE — 72125 CT NECK SPINE W/O DYE: CPT

## 2021-10-07 PROCEDURE — 72128 CT CHEST SPINE W/O DYE: CPT

## 2021-10-07 PROCEDURE — 71045 X-RAY EXAM CHEST 1 VIEW: CPT

## 2021-10-07 PROCEDURE — 70450 CT HEAD/BRAIN W/O DYE: CPT

## 2021-10-07 PROCEDURE — 73110 X-RAY EXAM OF WRIST: CPT

## 2021-10-07 PROCEDURE — 99283 EMERGENCY DEPT VISIT LOW MDM: CPT

## 2021-10-07 PROCEDURE — 6370000000 HC RX 637 (ALT 250 FOR IP): Performed by: EMERGENCY MEDICINE

## 2021-10-07 PROCEDURE — 73130 X-RAY EXAM OF HAND: CPT

## 2021-10-07 RX ORDER — ACETAMINOPHEN 325 MG/1
650 TABLET ORAL ONCE
Status: COMPLETED | OUTPATIENT
Start: 2021-10-07 | End: 2021-10-07

## 2021-10-07 RX ADMIN — ACETAMINOPHEN 650 MG: 325 TABLET, FILM COATED ORAL at 20:51

## 2021-10-07 ASSESSMENT — PAIN - FUNCTIONAL ASSESSMENT: PAIN_FUNCTIONAL_ASSESSMENT: 0-10

## 2021-10-07 ASSESSMENT — PAIN SCALES - GENERAL
PAINLEVEL_OUTOF10: 10

## 2021-10-07 ASSESSMENT — PAIN DESCRIPTION - PAIN TYPE
TYPE: ACUTE PAIN
TYPE: ACUTE PAIN

## 2021-10-07 ASSESSMENT — PAIN DESCRIPTION - ORIENTATION
ORIENTATION: LEFT
ORIENTATION: LEFT

## 2021-10-07 ASSESSMENT — PAIN DESCRIPTION - DESCRIPTORS: DESCRIPTORS: ACHING

## 2021-10-07 ASSESSMENT — PAIN DESCRIPTION - LOCATION
LOCATION: BACK;WRIST
LOCATION: WRIST

## 2021-10-07 NOTE — ED PROVIDER NOTES
[facility] ED  EMERGENCY DEPARTMENT ENCOUNTER      Pt Name: Luz Pompa  MRN: [id number]  [place] 1950  Date of evaluation: 10/7/2021  Provider: Luis Jin MD    CHIEF COMPLAINT       Chief Complaint   Patient presents with    Fall     Onset 1 hr ago. Pt states she fell down approximately 9-11 steps. C/o left wrist, back pain. Denies LOC         HISTORY OF PRESENT ILLNESS   (Location/Symptom, Timing/Onset, Context/Setting, Quality, Duration, Modifying Factors, Severity)  Note limiting factors. Luz Pompa is a 66 y.o. female who presents to the emergency department     64-year-old very pleasant female presents emergency department with history for slipping and falling down approximately 10 wooden steps which are carpeted. Patient denies a loss of consciousness. She does not really admit to any neck discomfort. She states that her upper back does hurt. She denies any anterior chest discomfort. She also admits to discomfort about her left wrist and left hand. .  The patient also relates that she has had previous injuries to her left wrist necessitating surgical treatment          Nursing Notes were reviewed. REVIEW OF SYSTEMS    (2-9 systems for level 4, 10 or more for level 5)     Review of Systems   All other systems reviewed and are negative. Except as noted above the remainder of the review of systems was reviewed and negative.        PAST MEDICAL HISTORY     Past Medical History:   Diagnosis Date    Allergic rhinitis     Dyspepsia     Endometriosis     GERD (gastroesophageal reflux disease)     Hyperlipidemia     Hypertension     Irritable bowel syndrome     Osteoarthritis     Raynaud's phenomenon     Sprain of wrist, unspecified site     Traumatic arthropathy, forearm     Type 2 diabetes mellitus ([facility] [address]     diet-controlled    Venous insufficiency     Chronic Peripheral         SURGICAL HISTORY       Past Surgical History:   Procedure Laterality Date    BLADDER SUSPENSION  1989    CERVICAL SPINE SURGERY  2005    COLONOSCOPY  07/2009    Normal    COLONOSCOPY  12/17/2019    -diverticulosis,hemorrhoids    COLONOSCOPY N/A 12/17/2019    COLORECTAL CANCER SCREENING, NOT HIGH RISK performed by Adriana Mancilla MD at [address]      tip of left index finger    HYSTERECTOMY, TOTAL ABDOMINAL  1989    WRIST FRACTURE SURGERY Left 1992         CURRENT MEDICATIONS       Discharge Medication List as of 10/7/2021  8:48 PM      CONTINUE these medications which have NOT CHANGED    Details   triamterene-hydroCHLOROthiazide (DYAZIDE) 37.5-25 MG per capsule Take 1 capsule by mouth daily, Disp-90 capsule, R-3Normal      gabapentin (NEURONTIN) 100 MG capsule Take 1 capsule by mouth 2 times daily for 90 days. , Disp-180 capsule, R-0Normal      hydrocortisone 2.5 % cream Apply topically 2 times daily. , Disp-1 each, R-2, Normal      buprenorphine (BUTRANS) 5 MCG/HR PTWK Place 1 patch onto the skin once a week for 84 days. , Disp-4 patch, R-2Print      Diclofenac Sodium (DICLO GEL) 1 % KIT Apply 1 applicator topically three times daily, Disp-1 kit, R-1Print      simvastatin (ZOCOR) 40 MG tablet Take 1 tablet by mouth nightly, Disp-90 tablet, R-3Normal      omeprazole (PRILOSEC) 40 MG delayed release capsule Take 1 capsule by mouth daily, Disp-90 capsule, R-3Normal      furosemide (LASIX) 20 MG tablet Take 1 tablet by mouth daily, Disp-90 tablet, R-3Normal      escitalopram (LEXAPRO) 10 MG tablet Take 1 tablet by mouth daily, Disp-90 tablet, R-3Normal      potassium chloride (KLOR-CON M20) 20 MEQ extended release tablet Take 1 tablet by mouth 2 times daily, Disp-180 tablet,R-3Normal      magnesium citrate solution Take 296 mLs by mouth onceHistorical Med      b complex vitamins capsule Take 1 capsule by mouth dailyHistorical Med      aspirin 81 MG tablet Take 1 tablet by mouth daily, Disp-90 tablet, R-3      calcium carbonate-vitamin D (CALCIUM + D) 600-200 Clubs or Organizations: No    Attends Club or Organization Meetings: Never    Marital Status:    Intimate Partner Violence: Not At Risk    Fear of Current or Ex-Partner: No    Emotionally Abused: No    Physically Abused: No    Sexually Abused: No       SCREENINGS                        PHYSICAL EXAM    (up to 7 for level 4, 8 or more for level 5)     ED Triage Vitals [10/07/21 1931]   BP Temp Temp src Pulse Resp SpO2 Height Weight   138/72 97.9 °F (36.6 °C) -- 76 18 100 % 5' 3\" (1.6 m) 162 lb (73.5 kg)       Physical Exam  Vitals and nursing note reviewed. HENT:      Head:      Comments: Abrasion left side of patient's forehead without hematoma formation     Nose: Nose normal.      Mouth/Throat:      Mouth: Mucous membranes are moist.   Eyes:      Extraocular Movements: Extraocular movements intact. Pupils: Pupils are equal, round, and reactive to light. Neck:      Comments: There is no subcu crepitus  Cardiovascular:      Rate and Rhythm: Normal rate and regular rhythm. Pulses: Normal pulses. Heart sounds: Normal heart sounds. Pulmonary:      Effort: Pulmonary effort is normal.      Breath sounds: Normal breath sounds. Abdominal:      General: Abdomen is flat. Palpations: Abdomen is soft. Tenderness: There is no abdominal tenderness. Musculoskeletal:      Cervical back: Tenderness (Nonspecific tenderness) present. Right lower leg: No edema. Left lower leg: No edema.       Comments: Well-healed surgical incisional scar dorsal aspect left wrist no gross deformities appreciated-neurovascular sensation found to be intact    Nonspecific tenderness without gross swelling is noted about the left wrist without anatomical snuffbox tenderness-there is also minimal tenderness dorsal aspect the patient's left hand    There are no open wounds-neurovascular sensation found to be intact    Dorsal spine demonstrates nonspecific tenderness without crepitus   Skin: General: Skin is warm. Capillary Refill: Capillary refill takes less than 2 seconds. Neurological:      General: No focal deficit present. Mental Status: She is alert and oriented to person, place, and time. Cranial Nerves: No cranial nerve deficit. Sensory: No sensory deficit. Motor: No weakness. DIAGNOSTIC RESULTS     EKG: All EKG's are interpreted by the Emergency Department Physician who either signs or Co-signs this chart in the absence of a cardiologist.      RADIOLOGY:   Non-plain film images such as CT, Ultrasound and MRI are read by the radiologist. Plain radiographic images are visualized and preliminarily interpreted by the emergency physician with the below findings:      Interpretation per the Radiologist below, if available at the time of this note:    CT THORACIC SPINE WO CONTRAST   Final Result   No acute bony abnormalities are noted in the cervical and thoracic spine. CT Cervical Spine WO Contrast   Final Result   No acute bony abnormalities are noted in the cervical and thoracic spine. CT Head WO Contrast   Final Result   Mild central and cortical cerebral atrophy. Mild chronic deep white matter ischemic changes      No acute intracranial abnormalities are noted. XR HAND LEFT (MIN 3 VIEWS)   Final Result   No acute bony abnormalities are noted      Arthritic and postsurgical changes         XR WRIST LEFT (MIN 3 VIEWS)   Final Result   No acute bony abnormalities are noted      Arthritic and postsurgical changes         XR CHEST PORTABLE   Final Result   No acute cardiopulmonary disease               ED BEDSIDE ULTRASOUND:   Performed by ED Physician - none    LABS:  Labs Reviewed - No data to display    All other labs were within normal range or not returned as of this dictation.     EMERGENCY DEPARTMENT COURSE and DIFFERENTIAL DIAGNOSIS/MDM:   Vitals:    Vitals:    10/07/21 1931 10/07/21 2056   BP: 138/72 (!) 148/81   Pulse: 76 94 Resp: 18    Temp: 97.9 °F (36.6 °C)    SpO2: 100% 98%   Weight: 162 lb (73.5 kg)    Height: 5' 3\" (1.6 m)          MDM  Number of Diagnoses or Management Options  Closed head injury, initial encounter  Injury of head, initial encounter  Left wrist pain  Neck pain  Pain in thoracic spine  Diagnosis management comments: Very pleasant 68-year-old female presents emergency department with concerns of upper back discomfort left hand left wrist discomfort after sustaining mechanical fall down approximately 10 wooden carpet covered steps. No loss consciousness    Radiographic studies have been reviewed discussed with the patient-patient prefers no additional medications after receiving Tylenol    Discharge instructions were acknowledged by the patient and another responsible family member in presence recommending follow-up as documented        REASSESSMENT     Neurovascular intact and remains intact left hand left wrist, left hand before and after splint application  ED Course as of Oct 07 2209   Thu Oct 07, 2021   2023 CT brain no acute process radiologist read   CT Head WO Contrast [RS]   2024 Chest x-ray no acute process radiologist read   XR CHEST PORTABLE [RS]   2024 Left hand left wrist no acute process radiologist read   XR WRIST LEFT (MIN 3 VIEWS) [RS]   2028 CT cervical spine thoracic spine no acute process radiologist read   CT Cervical Spine WO Contrast [RS]      ED Course User Index  [RS] Beth Saavedra MD         CRITICAL CARE TIME   Total Critical Care time was minutes, excluding separately reportable procedures. There was a high probability of clinically significant/life threatening deterioration in the patient's condition which required my urgent intervention. CONSULTS:  None    PROCEDURES:  Unless otherwise noted below, none     Procedures    FINAL IMPRESSION      1. Injury of head, initial encounter    2. Closed head injury, initial encounter    3. Neck pain    4. Pain in thoracic spine    5. Left wrist pain          DISPOSITION/PLAN   DISPOSITION Decision To Discharge 10/07/2021 09:01:35 PM      PATIENT REFERRED TO:  Georgette Parra MD  [facility], [address]  [phone]    Call in 3 days      Apoorva Gillespie MD  [address] [facility] [address]  [phone]    Call in 2 days        DISCHARGE MEDICATIONS:  Discharge Medication List as of 10/7/2021  8:48 PM        Controlled Substances Monitoring:     RX Monitoring 8/24/2021   Attestation -   Periodic Controlled Substance Monitoring No signs of potential drug abuse or diversion identified.    Chronic Pain > 80 MEDD -       (Please note that portions of this note were completed with a voice recognition program.  Efforts were made to edit the dictations but occasionally words are mis-transcribed.)    Janae Kolb MD (electronically signed)  Attending Emergency Physician            Janae Kolb MD  10/07/21 5268

## 2021-11-16 ENCOUNTER — HOSPITAL ENCOUNTER (OUTPATIENT)
Dept: PAIN MANAGEMENT | Age: 71
Discharge: HOME | End: 2021-11-16
Payer: MEDICARE

## 2021-11-16 VITALS
HEIGHT: 65 IN | TEMPERATURE: 97.6 F | HEART RATE: 80 BPM | WEIGHT: 163 LBS | BODY MASS INDEX: 27.16 KG/M2 | SYSTOLIC BLOOD PRESSURE: 137 MMHG | DIASTOLIC BLOOD PRESSURE: 71 MMHG | RESPIRATION RATE: 18 BRPM

## 2021-11-16 DIAGNOSIS — M54.2 CERVICALGIA: ICD-10-CM

## 2021-11-16 PROCEDURE — 99214 OFFICE O/P EST MOD 30 MIN: CPT

## 2021-11-16 PROCEDURE — 99213 OFFICE O/P EST LOW 20 MIN: CPT | Performed by: ANESTHESIOLOGY

## 2021-11-16 RX ORDER — DICLOFENAC SODIUM, ISOPROPYL ALCOHOL 1 %
1 KIT TOPICAL 3 TIMES DAILY
Qty: 1 KIT | Refills: 1 | Status: SHIPPED | OUTPATIENT
Start: 2021-11-16 | End: 2022-01-11 | Stop reason: SDUPTHER

## 2021-11-16 RX ORDER — BUPRENORPHINE 5 UG/H
1 PATCH TRANSDERMAL WEEKLY
Qty: 4 PATCH | Refills: 1 | Status: SHIPPED | OUTPATIENT
Start: 2021-11-18 | End: 2022-01-11 | Stop reason: SDUPTHER

## 2021-11-16 ASSESSMENT — PAIN DESCRIPTION - DESCRIPTORS: DESCRIPTORS: ACHING;BURNING;THROBBING

## 2021-11-16 ASSESSMENT — PAIN DESCRIPTION - PAIN TYPE: TYPE: CHRONIC PAIN

## 2021-11-16 ASSESSMENT — PAIN DESCRIPTION - PROGRESSION: CLINICAL_PROGRESSION: NOT CHANGED

## 2021-11-16 ASSESSMENT — PAIN DESCRIPTION - LOCATION: LOCATION: ARM;WRIST

## 2021-11-16 ASSESSMENT — PAIN DESCRIPTION - ONSET: ONSET: ON-GOING

## 2021-11-16 ASSESSMENT — PAIN DESCRIPTION - ORIENTATION: ORIENTATION: LEFT

## 2021-11-16 ASSESSMENT — PAIN SCALES - GENERAL: PAINLEVEL_OUTOF10: 6

## 2021-11-16 ASSESSMENT — PAIN DESCRIPTION - FREQUENCY: FREQUENCY: INTERMITTENT

## 2021-11-16 ASSESSMENT — PAIN - FUNCTIONAL ASSESSMENT: PAIN_FUNCTIONAL_ASSESSMENT: ACTIVITIES ARE NOT PREVENTED

## 2021-11-16 NOTE — PROGRESS NOTES
PAIN   Patient Name:  Gonzalo Ureña  :   1950  Clinic Visit Date: 2021      REVIEW OF SYSTEMS    Constitutional Weight changes: absent, change in appetite: absent Fatigue: absent; Fevers : absent, Any recent hospitalizations:  absent   HEENT Ears: normal,  Visual disturbance: absent   Reespiratory Shortness of breath: absent, choking:  absent, Cough: absent, Snoring : absent   Cardivascular Chest pain: absent, Leg swelling :absent, palpitations : absent, fainting : absent   GI Constipation: absent, Diarrhea: absent, Swallowing change: absent    Urinary frequency: absent, Urinary urgency: absent, Urinary incontinence: absent   Musculoskeletal Neck pain: absent, Back pain: absent, Stiffness: absent, Muscle pain: present, Joint pain: absent, restless leg : absent   Dermatological Hair loss: absent, Skin changes: absent   Neurological Confusion: absent, Trouble concentrating: absent, Seizures: absent;  Memory loss: absent, balance problem: absent, Dizziness: absent, vertigo: absent, Weakness: absent, Numbness absent, Tremor: absent, Spasm: absent, involuntary movement: absent, Speech difficulty: absent, Headache: absent, Light sensitivity: absent   Psychiatric Anxiety: absent, Depression  absent, drug abuse: absent, Hallucination: absent, mood disorder: absent, Suicidal ideations absent   Hematologic Abnormal bleeding: absent, Anemia: absent, Lymph gland changes: absent Clotting disorder: absent

## 2022-01-11 ENCOUNTER — HOSPITAL ENCOUNTER (OUTPATIENT)
Dept: PAIN MANAGEMENT | Age: 72
Discharge: HOME | End: 2022-01-11
Payer: COMMERCIAL

## 2022-01-11 VITALS
TEMPERATURE: 96.5 F | SYSTOLIC BLOOD PRESSURE: 159 MMHG | WEIGHT: 165 LBS | RESPIRATION RATE: 18 BRPM | DIASTOLIC BLOOD PRESSURE: 84 MMHG | HEART RATE: 81 BPM | HEIGHT: 65 IN | BODY MASS INDEX: 27.49 KG/M2

## 2022-01-11 DIAGNOSIS — M54.2 CERVICALGIA: ICD-10-CM

## 2022-01-11 PROCEDURE — 99213 OFFICE O/P EST LOW 20 MIN: CPT

## 2022-01-11 PROCEDURE — 99213 OFFICE O/P EST LOW 20 MIN: CPT | Performed by: ANESTHESIOLOGY

## 2022-01-11 RX ORDER — DICLOFENAC SODIUM, ISOPROPYL ALCOHOL 1 %
1 KIT TOPICAL 3 TIMES DAILY
Qty: 1 KIT | Refills: 2 | Status: SHIPPED | OUTPATIENT
Start: 2022-01-11 | End: 2022-03-11 | Stop reason: SDUPTHER

## 2022-01-11 RX ORDER — BUPRENORPHINE 5 UG/H
1 PATCH TRANSDERMAL WEEKLY
Qty: 4 PATCH | Refills: 1 | Status: SHIPPED | OUTPATIENT
Start: 2022-01-11 | End: 2022-03-11 | Stop reason: SDUPTHER

## 2022-01-11 ASSESSMENT — PAIN DESCRIPTION - FREQUENCY: FREQUENCY: INTERMITTENT

## 2022-01-11 ASSESSMENT — PAIN DESCRIPTION - PAIN TYPE: TYPE: CHRONIC PAIN

## 2022-01-11 ASSESSMENT — PAIN SCALES - GENERAL: PAINLEVEL_OUTOF10: 4

## 2022-01-11 ASSESSMENT — PAIN DESCRIPTION - LOCATION: LOCATION: ARM;WRIST

## 2022-01-11 ASSESSMENT — PAIN DESCRIPTION - ONSET: ONSET: ON-GOING

## 2022-01-11 ASSESSMENT — PAIN DESCRIPTION - DESCRIPTORS: DESCRIPTORS: ACHING;BURNING;THROBBING

## 2022-01-11 ASSESSMENT — PAIN DESCRIPTION - ORIENTATION: ORIENTATION: LEFT

## 2022-01-11 ASSESSMENT — PAIN DESCRIPTION - PROGRESSION: CLINICAL_PROGRESSION: NOT CHANGED

## 2022-01-11 ASSESSMENT — PAIN - FUNCTIONAL ASSESSMENT: PAIN_FUNCTIONAL_ASSESSMENT: ACTIVITIES ARE NOT PREVENTED

## 2022-01-11 NOTE — PROGRESS NOTES
PROGRESS:  Her pain is now at a 4 and she is well controlled with the buprenorphine patches I gave her another refill for 2 months  Her neck pain is almost gone and the only pain that she has now is in the wrist and I think she has a hardware that is bothering her hard time flexing and extending the wrist    Current Pain Assessment  Pain Assessment  Pain Assessment: 0-10  Pain Level: 4  Patient's Stated Pain Goal: 1  Pain Type: Chronic pain  Pain Location: Arm,Wrist  Pain Orientation: Left  Pain Descriptors: Aching,Burning,Throbbing  Pain Frequency: Intermittent  Pain Onset: On-going  Clinical Progression: Not changed  Functional Pain Assessment: Activities are not prevented       ADVERSE MEDICATION EFFECTS:   Nausea and vomiting: no   Constipation:   no                  Dizziness/drowsy/sleepy-no  Urinary Retention: no    ACTIVITY/SOCIAL/EMOTIONAL:  Sleep Pattern:6 hrs  Home Exercises: daily  Activity:increased  Emotional Issues: normal.     ABERRANT BEHAVIORS SINCE LAST VISIT  Lost rx/pills:------------------------------------------ no  Taking  medication as prescribed: -----------yes  Urine Drug Screen --------------------------------- yes  Recent ER visits: ------------------------------------no  Pill count is appropriate: ---------------------------yes   Refills for prescriptions appropriate:----------yes  Physical Exam  Constitutional:       General: She is not in acute distress. Appearance: She is not diaphoretic. HENT:      Head: Normocephalic. Right Ear: External ear normal.      Left Ear: External ear normal.      Nose: Nose normal.   Eyes:      Pupils: Pupils are equal, round, and reactive to light. Cardiovascular:      Rate and Rhythm: Normal rate. Heart sounds: Normal heart sounds. Pulmonary:      Effort: Pulmonary effort is normal.      Breath sounds: Normal breath sounds. Abdominal:      Palpations: Abdomen is soft. Musculoskeletal:         General: Normal range of motion. Cervical back: Normal range of motion and neck supple. Skin:     General: Skin is warm and dry. Neurological:      Mental Status: She is alert and oriented to person, place, and time. Psychiatric:         Behavior: Behavior normal.       Ortho Exam    LABS:    Lab Results   Component Value Date/Time    AMPHSUR NEGATIVE 01/21/2020 10:30 AM    BARBSCNU NEGATIVE 01/21/2020 10:30 AM    LABBENZ NEGATIVE 01/21/2020 10:30 AM    LABBENZ NEGATIVE 02/07/2013 09:30 AM    COCAINEMETUR NEGATIVE 01/21/2020 10:30 AM    LABMETH NEGATIVE 01/21/2020 10:30 AM    OPIAU NEGATIVE 01/21/2020 10:30 AM    PHENCYCU NEGATIVE 01/21/2020 10:30 AM    PPXUR NEGATIVE 01/21/2020 10:30 AM    CANSU NEGATIVE 01/21/2020 10:30 AM    OXTCOSU NEGATIVE 01/21/2020 10:30 AM    METAMPU NEGATIVE 01/21/2020 10:30 AM    TRICYUR NEGATIVE 01/21/2020 10:30 AM    MDMA NOT REPORTED 01/21/2020 10:30 AM    BUPRENUR NEGATIVE 01/21/2020 10:30 AM    LABTEST NOT REPORTED 01/21/2020 10:30 AM       Lab Results   Component Value Date/Time    MG 1.8 10/14/2019 03:05 PM    VITD25 79.5 03/02/2021 10:31 AM    TSH 0.1380 01/15/2021 12:00 AM       No results found for: KATHY, MPO, PR3, C5, HEPCAB    IMAGING:    No results found. DIAGNOSIS:  Principal Problem:    Chronic pain  Active Problems:    Cervicalgia  Resolved Problems:    * No resolved hospital problems. *      TREATMENT   Buprenorphine was represcribed next appointment on March  Meditation routine was discussed  Breathing exercises and how to do them was discussed  Counseling for chronic pain was done  Ill effects of being on chronic pain medications such as sleep disturbances, hormonal changes, withdrawal symptoms,  chronic opioid dependence and tolerance were discussed with patient. I had asked the patient to minimize medication use and utilize pain medications only for uncontrolled rest pain or pain with exertional activities. I advised patient not to self escalate pain medications without consulting with us.   At each of patient's future visits we will try to taper pain medications, while adjusting the adjunct medications, and re-evaluating for Physical Therapy to improve spinal and joint strength. We will continue to have discussions to decrease pain medications as tolerated. I also discussed with the patient regarding the dangers of combining narcotic pain medication with tranquilizers, alcohol or illegal drugs or taking the medication any other than prescribed. The dangers including the respiratory depression and death. Patient was told to tell  to all  physicians regarding the medications he is getting from pain clinic. Patient is warned not to take any unprescribed medications over-the-counter medications that can depress breathing . Patient is advised to talk to the pharmacist or physicians if planning to take any over-the-counter medications before  takeing them. Patient is strongly advised to avoid tranquilizers or  Relaxants for any medications that can depress breathing or recreational drugs. Patient is also advised to tell us if there is any changes in his medications from other physicians. We discussed the same at today's visit . Exercises were shown and how to do them was discussed    Consult   :  Acupuncture:  Next visit     :          CHRONIC PAIN MANAGEMENT PATIENT  Pain Agreement Signed: yes  Risk and benefits of opioid have been discussed with the patient.   Acupuncture Consent Signed: yes  Non-opioid trials were inappropriate and provided insufficient pain relief   OARRS Attested: yes      (Please note that portions of this note were completed with a voice recognition program.  Efforts were made to edit the dictations but occasionallywords are mis-transcribed.)    Electronically signed by Renata Stroud MD on 1/11/2022 at 10:29 AM

## 2022-01-11 NOTE — PROGRESS NOTES
PAIN   Patient Name:  Kathy Vaz  :   1950  Clinic Visit Date: 2022      REVIEW OF SYSTEMS    Constitutional Weight changes: absent, change in appetite: absent Fatigue: absent; Fevers : absent, Any recent hospitalizations:  absent   HEENT Ears: normal,  Visual disturbance: absent   Reespiratory Shortness of breath: absent, choking:  absent, Cough: absent, Snoring : absent   Cardivascular Chest pain: absent, Leg swelling :absent, palpitations : absent, fainting : absent   GI Constipation: absent, Diarrhea: absent, Swallowing change: absent    Urinary frequency: absent, Urinary urgency: absent, Urinary incontinence: absent   Musculoskeletal Neck pain: absent, Back pain: present, Stiffness: present, Muscle pain: present, Joint pain: absent, restless leg : absent   Dermatological Hair loss: absent, Skin changes: absent   Neurological Confusion: absent, Trouble concentrating: absent, Seizures: absent;  Memory loss: absent, balance problem: absent, Dizziness: absent, vertigo: absent, Weakness: absent, Numbness absent, Tremor: absent, Spasm: absent, involuntary movement: absent, Speech difficulty: absent, Headache: absent, Light sensitivity: absent   Psychiatric Anxiety: absent, Depression  absent, drug abuse: absent, Hallucination: absent, mood disorder: absent, Suicidal ideations absent   Hematologic Abnormal bleeding: absent, Anemia: absent, Lymph gland changes: absent Clotting disorder: absent

## 2022-03-11 ENCOUNTER — HOSPITAL ENCOUNTER (OUTPATIENT)
Dept: PAIN MANAGEMENT | Age: 72
Discharge: HOME | End: 2022-03-11
Payer: COMMERCIAL

## 2022-03-11 VITALS
SYSTOLIC BLOOD PRESSURE: 136 MMHG | WEIGHT: 159.1 LBS | HEART RATE: 70 BPM | HEIGHT: 65 IN | DIASTOLIC BLOOD PRESSURE: 70 MMHG | TEMPERATURE: 97.6 F | BODY MASS INDEX: 26.51 KG/M2 | RESPIRATION RATE: 18 BRPM

## 2022-03-11 DIAGNOSIS — M54.2 CERVICALGIA: ICD-10-CM

## 2022-03-11 PROBLEM — E55.9 VITAMIN D DEFICIENCY: Status: RESOLVED | Noted: 2021-03-02 | Resolved: 2022-03-11

## 2022-03-11 PROCEDURE — 99213 OFFICE O/P EST LOW 20 MIN: CPT

## 2022-03-11 PROCEDURE — 99213 OFFICE O/P EST LOW 20 MIN: CPT | Performed by: ANESTHESIOLOGY

## 2022-03-11 RX ORDER — DICLOFENAC SODIUM, ISOPROPYL ALCOHOL 1 %
1 KIT TOPICAL 3 TIMES DAILY
Qty: 1 KIT | Refills: 2 | Status: SHIPPED | OUTPATIENT
Start: 2022-03-11 | End: 2022-05-10

## 2022-03-11 RX ORDER — BUPRENORPHINE 5 UG/H
1 PATCH TRANSDERMAL WEEKLY
Qty: 4 PATCH | Refills: 2 | Status: SHIPPED | OUTPATIENT
Start: 2022-03-24 | End: 2022-06-16

## 2022-03-11 ASSESSMENT — PAIN DESCRIPTION - DESCRIPTORS: DESCRIPTORS: CONSTANT;THROBBING;ACHING

## 2022-03-11 ASSESSMENT — PAIN - FUNCTIONAL ASSESSMENT: PAIN_FUNCTIONAL_ASSESSMENT: PREVENTS OR INTERFERES SOME ACTIVE ACTIVITIES AND ADLS

## 2022-03-11 ASSESSMENT — PAIN DESCRIPTION - ONSET: ONSET: AWAKENED FROM SLEEP

## 2022-03-11 ASSESSMENT — PAIN DESCRIPTION - PAIN TYPE: TYPE: CHRONIC PAIN

## 2022-03-11 ASSESSMENT — PAIN DESCRIPTION - LOCATION: LOCATION: ARM

## 2022-03-11 ASSESSMENT — PAIN DESCRIPTION - ORIENTATION: ORIENTATION: LEFT

## 2022-03-11 ASSESSMENT — PAIN DESCRIPTION - PROGRESSION: CLINICAL_PROGRESSION: NOT CHANGED

## 2022-03-11 ASSESSMENT — PAIN SCALES - GENERAL: PAINLEVEL_OUTOF10: 5

## 2022-03-11 ASSESSMENT — PAIN DESCRIPTION - FREQUENCY: FREQUENCY: CONTINUOUS

## 2022-03-11 NOTE — PROGRESS NOTES
PAIN   Patient Name:  Randy Juarez  :   1950  Clinic Visit Date: 3/11/2022      REVIEW OF SYSTEMS    Constitutional Weight changes: absent, change in appetite: absent Fatigue: absent; Fevers : absent, Any recent hospitalizations:  absent   HEENT Ears: normal,  Visual disturbance: absent   Reespiratory Shortness of breath: absent, choking:  absent, Cough: absent, Snoring : absent   Cardivascular Chest pain: absent, Leg swelling :absent, palpitations : absent, fainting : absent   GI Constipation: absent, Diarrhea: absent, Swallowing change: absent    Urinary frequency: absent, Urinary urgency: absent, Urinary incontinence: absent   Musculoskeletal Neck pain: absent, Back pain: absent, Stiffness: absent, Muscle pain: absent, Joint pain: absent, restless leg : absent   Dermatological Hair loss: absent, Skin changes: absent   Neurological Confusion: absent, Trouble concentrating: absent, Seizures: absent;  Memory loss: absent, balance problem: absent, Dizziness: absent, vertigo: absent, Weakness: absent, Numbness absent, Tremor: absent, Spasm: absent, involuntary movement: absent, Speech difficulty: absent, Headache: absent, Light sensitivity: absent   Psychiatric Anxiety: absent, Depression  absent, drug abuse: absent, Hallucination: absent, mood disorder: absent, Suicidal ideations absent   Hematologic Abnormal bleeding: absent, Anemia: absent, Lymph gland changes: absent Clotting disorder: absent

## 2022-03-11 NOTE — PROGRESS NOTES
PROGRESS:  She has been going through traumatic life experience right now with her  passing away otherwise her neck pain is much better but her wrist pain is still there and her finger throbs but she puts the gel and it makes it better she has decided that she will slowly wean herself off the buprenorphine patches I am just giving her 1 prescription for 3 months    Current Pain Assessment  Pain Assessment  Pain Assessment: 0-10  Pain Level: 5  Patient's Stated Pain Goal: 1  Pain Type: Chronic pain  Pain Location: Arm  Pain Orientation: Left  Pain Descriptors: Constant,Throbbing,Aching  Pain Frequency: Continuous  Pain Onset: Awakened from sleep  Clinical Progression: Not changed  Functional Pain Assessment: Prevents or interferes some active activities and ADLs       ADVERSE MEDICATION EFFECTS:   Nausea and vomiting: no   Constipation:   no                  Dizziness/drowsy/sleepy-no  Urinary Retention: no    ACTIVITY/SOCIAL/EMOTIONAL:  Sleep Pattern:6 hrs  Home Exercises: daily  Activity:unchanged  Emotional Issues: anxiety/ nervousness.      ABERRANT BEHAVIORS SINCE LAST VISIT  Lost rx/pills:------------------------------------------ no  Taking  medication as prescribed: -----------yes  Urine Drug Screen --------------------------------- yes  Recent ER visits: ------------------------------------no  Pill count is appropriate: ---------------------------yes   Refills for prescriptions appropriate:----------yes  Physical Exam  Ortho Exam    LABS:    Lab Results   Component Value Date/Time    AMPHSUR NEGATIVE 01/21/2020 10:30 AM    BARBSCNU NEGATIVE 01/21/2020 10:30 AM    LABBENZ NEGATIVE 01/21/2020 10:30 AM    LABBENZ NEGATIVE 02/07/2013 09:30 AM    COCAINEMETUR NEGATIVE 01/21/2020 10:30 AM    LABMETH NEGATIVE 01/21/2020 10:30 AM    OPIAU NEGATIVE 01/21/2020 10:30 AM    PHENCYCU NEGATIVE 01/21/2020 10:30 AM    PPXUR NEGATIVE 01/21/2020 10:30 AM    CANSU NEGATIVE 01/21/2020 10:30 AM    OXTCOSU NEGATIVE 01/21/2020 10:30 AM    METAMPU NEGATIVE 01/21/2020 10:30 AM    TRICYUR NEGATIVE 01/21/2020 10:30 AM    MDMA NOT REPORTED 01/21/2020 10:30 AM    BUPRENUR NEGATIVE 01/21/2020 10:30 AM    LABTEST NOT REPORTED 01/21/2020 10:30 AM       Lab Results   Component Value Date/Time    MG 1.8 10/14/2019 03:05 PM    VITD25 79.5 03/02/2021 10:31 AM    TSH 0.1380 01/15/2021 12:00 AM       No results found for: KATHY, MPO, PR3, C5, HEPCAB    IMAGING:    No results found. DIAGNOSIS:  Principal Problem:    Chronic pain  Active Problems:    Cervicalgia  Resolved Problems:    * No resolved hospital problems. *      TREATMENT   We reordered the buprenorphine for another 3 months and then she will slowly wean her off during that. She does not want to be referred anywhere  Meditation routine was discussed  Breathing exercises and how to do them was discussed  Counseling for chronic pain was done  Ill effects of being on chronic pain medications such as sleep disturbances, hormonal changes, withdrawal symptoms,  chronic opioid dependence and tolerance were discussed with patient. I had asked the patient to minimize medication use and utilize pain medications only for uncontrolled rest pain or pain with exertional activities. I advised patient not to self escalate pain medications without consulting with us. At each of patient's future visits we will try to taper pain medications, while adjusting the adjunct medications, and re-evaluating for Physical Therapy to improve spinal and joint strength. We will continue to have discussions to decrease pain medications as tolerated. I also discussed with the patient regarding the dangers of combining narcotic pain medication with tranquilizers, alcohol or illegal drugs or taking the medication any other than prescribed. The dangers including the respiratory depression and death. Patient was told to tell  to all  physicians regarding the medications he is getting from pain clinic.  Patient is warned not to take any unprescribed medications over-the-counter medications that can depress breathing . Patient is advised to talk to the pharmacist or physicians if planning to take any over-the-counter medications before  takeing them. Patient is strongly advised to avoid tranquilizers or  Relaxants for any medications that can depress breathing or recreational drugs. Patient is also advised to tell us if there is any changes in his medications from other physicians. We discussed the same at today's visit . Exercises were shown and how to do them was discussed    Consult   :  Acupuncture:  Next visit     :          CHRONIC PAIN MANAGEMENT PATIENT  Pain Agreement Signed: yes  Risk and benefits of opioid have been discussed with the patient.   Acupuncture Consent Signed: yes  Non-opioid trials were inappropriate and provided insufficient pain relief   OARRS Attested: yes      (Please note that portions of this note were completed with a voice recognition program.  Efforts were made to edit the dictations but occasionallywords are mis-transcribed.)    Electronically signed by Wilbert Colmenares MD on 3/11/2022 at 10:00 AM

## 2022-04-12 ENCOUNTER — HOSPITAL ENCOUNTER (OUTPATIENT)
Age: 72
Discharge: HOME | End: 2022-04-12
Payer: MEDICARE

## 2022-04-12 DIAGNOSIS — E78.2 MIXED HYPERLIPIDEMIA: ICD-10-CM

## 2022-04-12 DIAGNOSIS — I10 ESSENTIAL HYPERTENSION: ICD-10-CM

## 2022-04-12 DIAGNOSIS — E11.9 TYPE 2 DIABETES MELLITUS WITHOUT COMPLICATION, WITHOUT LONG-TERM CURRENT USE OF INSULIN (HCC): ICD-10-CM

## 2022-04-12 LAB
ALBUMIN SERPL-MCNC: 4.4 G/DL (ref 3.5–5.2)
ALBUMIN/GLOBULIN RATIO: 1.4 (ref 1–2.5)
ALP BLD-CCNC: 69 U/L (ref 35–104)
ALT SERPL-CCNC: 15 U/L (ref 5–33)
ANION GAP SERPL CALCULATED.3IONS-SCNC: 13 MMOL/L (ref 9–17)
AST SERPL-CCNC: 18 U/L
BILIRUB SERPL-MCNC: 0.56 MG/DL (ref 0.3–1.2)
BUN BLDV-MCNC: 23 MG/DL (ref 8–23)
BUN/CREAT BLD: 36 (ref 9–20)
CALCIUM SERPL-MCNC: 9.4 MG/DL (ref 8.6–10.4)
CHLORIDE BLD-SCNC: 98 MMOL/L (ref 98–107)
CHOLESTEROL, FASTING: 198 MG/DL
CHOLESTEROL/HDL RATIO: 2.6
CO2: 28 MMOL/L (ref 20–31)
CREAT SERPL-MCNC: 0.64 MG/DL (ref 0.5–0.9)
CREATININE URINE: 139 MG/DL (ref 28–217)
GFR AFRICAN AMERICAN: >60 ML/MIN
GFR NON-AFRICAN AMERICAN: >60 ML/MIN
GFR SERPL CREATININE-BSD FRML MDRD: ABNORMAL ML/MIN/{1.73_M2}
GFR SERPL CREATININE-BSD FRML MDRD: ABNORMAL ML/MIN/{1.73_M2}
GLUCOSE FASTING: 167 MG/DL (ref 70–99)
HDLC SERPL-MCNC: 75 MG/DL
LDL CHOLESTEROL: 104 MG/DL (ref 0–130)
MICROALBUMIN/CREAT 24H UR: <12 MG/L
MICROALBUMIN/CREAT UR-RTO: NORMAL MCG/MG CREAT
POTASSIUM SERPL-SCNC: 3.2 MMOL/L (ref 3.7–5.3)
SODIUM BLD-SCNC: 139 MMOL/L (ref 135–144)
TOTAL PROTEIN: 7.5 G/DL (ref 6.4–8.3)
TRIGLYCERIDE, FASTING: 96 MG/DL

## 2022-04-12 PROCEDURE — 80061 LIPID PANEL: CPT

## 2022-04-12 PROCEDURE — 80053 COMPREHEN METABOLIC PANEL: CPT

## 2022-04-12 PROCEDURE — 83036 HEMOGLOBIN GLYCOSYLATED A1C: CPT

## 2022-04-12 PROCEDURE — 82570 ASSAY OF URINE CREATININE: CPT

## 2022-04-12 PROCEDURE — 82043 UR ALBUMIN QUANTITATIVE: CPT

## 2022-04-12 PROCEDURE — 36415 COLL VENOUS BLD VENIPUNCTURE: CPT

## 2022-04-13 LAB
ESTIMATED AVERAGE GLUCOSE: 157 MG/DL
HBA1C MFR BLD: 7.1 % (ref 4–6)

## 2022-04-28 ENCOUNTER — HOSPITAL ENCOUNTER (OUTPATIENT)
Dept: WOMENS IMAGING | Age: 72
Discharge: HOME | End: 2022-04-30
Payer: MEDICARE

## 2022-04-28 DIAGNOSIS — Z12.31 VISIT FOR SCREENING MAMMOGRAM: ICD-10-CM

## 2022-04-28 PROCEDURE — 77063 BREAST TOMOSYNTHESIS BI: CPT

## 2022-09-20 NOTE — PROGRESS NOTES
PAIN   Patient Name:  Nae Mccarthy  :   1950  Clinic Visit Date: 2020      REVIEW OF SYSTEMS    Constitutional Weight changes: absent, change in appetite: absent Fatigue: absent; Fevers : absent, Any recent hospitalizations:  absent   HEENT Ears: diminished,  Visual disturbance: absent   Reespiratory Shortness of breath: absent, choking:  absent, Cough: absent, Snoring : absent   Cardivascular Chest pain: absent, Leg swelling :absent, palpitations : absent, fainting : absent   GI Constipation: absent, Diarrhea: absent, Swallowing change: absent    Urinary frequency: absent, Urinary urgency: absent, Urinary incontinence: absent   Musculoskeletal Neck pain: absent, Back pain: absent, Stiffness: absent, Muscle pain: absent, Joint pain: present, restless leg : absent   Dermatological Hair loss: absent, Skin changes: absent   Neurological Confusion: absent, Trouble concentrating: absent, Seizures: absent;  Memory loss: absent, balance problem: absent, Dizziness: present, vertigo: present, Weakness: absent, Numbness absent, Tremor: absent, Spasm: absent, involuntary movement: absent, Speech difficulty: absent, Headache: absent, Light sensitivity: absent   Psychiatric Anxiety: absent, Depression  absent, drug abuse: absent, Hallucination: absent, mood disorder: absent, Suicidal ideations absent   Hematologic Abnormal bleeding: absent, Anemia: absent, Lymph gland changes: absent Clotting disorder: absent None

## 2022-10-11 ENCOUNTER — HOSPITAL ENCOUNTER (OUTPATIENT)
Age: 72
Discharge: HOME | End: 2022-10-11
Payer: MEDICARE

## 2022-10-11 DIAGNOSIS — E11.9 TYPE 2 DIABETES MELLITUS WITHOUT COMPLICATION, WITHOUT LONG-TERM CURRENT USE OF INSULIN (HCC): ICD-10-CM

## 2022-10-11 LAB
ANION GAP SERPL CALCULATED.3IONS-SCNC: 11 MMOL/L (ref 9–17)
BUN BLDV-MCNC: 13 MG/DL (ref 8–23)
BUN/CREAT BLD: 16 (ref 9–20)
CALCIUM SERPL-MCNC: 9.5 MG/DL (ref 8.6–10.4)
CHLORIDE BLD-SCNC: 98 MMOL/L (ref 98–107)
CO2: 31 MMOL/L (ref 20–31)
CREAT SERPL-MCNC: 0.8 MG/DL (ref 0.5–0.9)
CREATININE URINE: 180.1 MG/DL (ref 28–217)
GFR SERPL CREATININE-BSD FRML MDRD: >60 ML/MIN/1.73M2
GLUCOSE BLD-MCNC: 153 MG/DL (ref 70–99)
MICROALBUMIN/CREAT 24H UR: 19 MG/L
MICROALBUMIN/CREAT UR-RTO: 11 MCG/MG CREAT
POTASSIUM SERPL-SCNC: 4.2 MMOL/L (ref 3.7–5.3)
SODIUM BLD-SCNC: 140 MMOL/L (ref 135–144)

## 2022-10-11 PROCEDURE — 82043 UR ALBUMIN QUANTITATIVE: CPT

## 2022-10-11 PROCEDURE — 83036 HEMOGLOBIN GLYCOSYLATED A1C: CPT

## 2022-10-11 PROCEDURE — 82570 ASSAY OF URINE CREATININE: CPT

## 2022-10-11 PROCEDURE — 80048 BASIC METABOLIC PNL TOTAL CA: CPT

## 2022-10-11 PROCEDURE — 36415 COLL VENOUS BLD VENIPUNCTURE: CPT

## 2022-10-12 LAB
ESTIMATED AVERAGE GLUCOSE: 151 MG/DL
HBA1C MFR BLD: 6.9 % (ref 4–6)

## 2023-05-17 ENCOUNTER — HOSPITAL ENCOUNTER (OUTPATIENT)
Dept: WOMENS IMAGING | Age: 73
Discharge: HOME | End: 2023-05-19
Payer: MEDICARE

## 2023-05-17 DIAGNOSIS — Z12.39 SCREENING BREAST EXAMINATION: ICD-10-CM

## 2023-05-17 PROCEDURE — 77063 BREAST TOMOSYNTHESIS BI: CPT

## 2023-10-13 ENCOUNTER — HOSPITAL ENCOUNTER (OUTPATIENT)
Age: 73
Setting detail: SPECIMEN
Discharge: HOME | End: 2023-10-13

## 2023-10-13 DIAGNOSIS — I10 ESSENTIAL HYPERTENSION: ICD-10-CM

## 2023-10-13 DIAGNOSIS — E11.9 TYPE 2 DIABETES MELLITUS WITHOUT COMPLICATION, WITHOUT LONG-TERM CURRENT USE OF INSULIN (HCC): ICD-10-CM

## 2023-10-13 PROBLEM — M75.81 RIGHT ROTATOR CUFF TENDONITIS: Status: ACTIVE | Noted: 2023-10-13

## 2023-10-13 LAB
ANION GAP SERPL CALCULATED.3IONS-SCNC: 13 MMOL/L (ref 9–17)
BUN SERPL-MCNC: 22 MG/DL (ref 8–23)
CALCIUM SERPL-MCNC: 9.5 MG/DL (ref 8.6–10.4)
CHLORIDE SERPL-SCNC: 97 MMOL/L (ref 98–107)
CO2 SERPL-SCNC: 31 MMOL/L (ref 20–31)
CREAT SERPL-MCNC: 0.7 MG/DL (ref 0.5–0.9)
GFR SERPL CREATININE-BSD FRML MDRD: >60 ML/MIN/1.73M2
GLUCOSE SERPL-MCNC: 157 MG/DL (ref 70–99)
POTASSIUM SERPL-SCNC: 3.4 MMOL/L (ref 3.7–5.3)
SODIUM SERPL-SCNC: 141 MMOL/L (ref 135–144)

## 2023-10-15 LAB
EST. AVERAGE GLUCOSE BLD GHB EST-MCNC: 146 MG/DL
HBA1C MFR BLD: 6.7 % (ref 4–6)

## 2023-11-08 PROBLEM — M75.41 IMPINGEMENT SYNDROME OF RIGHT SHOULDER: Status: ACTIVE | Noted: 2023-11-08

## 2024-02-23 ENCOUNTER — HOSPITAL ENCOUNTER (OUTPATIENT)
Dept: VASCULAR LAB | Age: 74
End: 2024-02-23
Payer: MEDICARE

## 2024-02-23 DIAGNOSIS — M79.662 PAIN OF LEFT CALF: ICD-10-CM

## 2024-02-23 PROCEDURE — 93971 EXTREMITY STUDY: CPT

## 2024-02-24 LAB — ECHO BSA: 1.79 M2

## 2024-02-24 PROCEDURE — 93971 EXTREMITY STUDY: CPT | Performed by: STUDENT IN AN ORGANIZED HEALTH CARE EDUCATION/TRAINING PROGRAM

## 2024-04-15 ENCOUNTER — HOSPITAL ENCOUNTER (OUTPATIENT)
Age: 74
Discharge: HOME | End: 2024-04-15
Payer: MEDICARE

## 2024-04-15 DIAGNOSIS — I10 ESSENTIAL HYPERTENSION: ICD-10-CM

## 2024-04-15 DIAGNOSIS — E78.2 MIXED HYPERLIPIDEMIA: ICD-10-CM

## 2024-04-15 DIAGNOSIS — E11.9 TYPE 2 DIABETES MELLITUS WITHOUT COMPLICATION, WITHOUT LONG-TERM CURRENT USE OF INSULIN (HCC): ICD-10-CM

## 2024-04-15 LAB
ALBUMIN SERPL-MCNC: 4.3 G/DL (ref 3.5–5.2)
ALBUMIN/GLOB SERPL: 1.3 {RATIO} (ref 1–2.5)
ALP SERPL-CCNC: 83 U/L (ref 35–104)
ALT SERPL-CCNC: 13 U/L (ref 5–33)
ANION GAP SERPL CALCULATED.3IONS-SCNC: 13 MMOL/L (ref 9–17)
AST SERPL-CCNC: 18 U/L
BILIRUB SERPL-MCNC: 0.6 MG/DL (ref 0.3–1.2)
BUN SERPL-MCNC: 19 MG/DL (ref 8–23)
BUN/CREAT SERPL: 24 (ref 9–20)
CALCIUM SERPL-MCNC: 9.5 MG/DL (ref 8.6–10.4)
CHLORIDE SERPL-SCNC: 99 MMOL/L (ref 98–107)
CHOLEST SERPL-MCNC: 182 MG/DL (ref 0–199)
CHOLESTEROL/HDL RATIO: 2
CO2 SERPL-SCNC: 30 MMOL/L (ref 20–31)
CREAT SERPL-MCNC: 0.8 MG/DL (ref 0.5–0.9)
CREAT UR-MCNC: 193 MG/DL (ref 28–217)
EST. AVERAGE GLUCOSE BLD GHB EST-MCNC: 166 MG/DL
GFR SERPL CREATININE-BSD FRML MDRD: 77 ML/MIN/1.73M2
GLUCOSE P FAST SERPL-MCNC: 171 MG/DL (ref 70–99)
HBA1C MFR BLD: 7.4 % (ref 4–6)
HDLC SERPL-MCNC: 76 MG/DL
LDLC SERPL CALC-MCNC: 90 MG/DL (ref 0–100)
MICROALBUMIN UR-MCNC: 19 MG/L (ref 0–20)
MICROALBUMIN/CREAT UR-RTO: 10 MCG/MG CREAT (ref 0–25)
POTASSIUM SERPL-SCNC: 3.2 MMOL/L (ref 3.7–5.3)
PROT SERPL-MCNC: 7.7 G/DL (ref 6.4–8.3)
SODIUM SERPL-SCNC: 142 MMOL/L (ref 135–144)
TRIGL SERPL-MCNC: 80 MG/DL (ref 0–149)
VLDLC SERPL CALC-MCNC: 16 MG/DL

## 2024-04-15 PROCEDURE — 36415 COLL VENOUS BLD VENIPUNCTURE: CPT

## 2024-04-15 PROCEDURE — 83036 HEMOGLOBIN GLYCOSYLATED A1C: CPT

## 2024-04-15 PROCEDURE — 82570 ASSAY OF URINE CREATININE: CPT

## 2024-04-15 PROCEDURE — 80061 LIPID PANEL: CPT

## 2024-04-15 PROCEDURE — 82043 UR ALBUMIN QUANTITATIVE: CPT

## 2024-04-15 PROCEDURE — 80053 COMPREHEN METABOLIC PANEL: CPT

## 2024-05-15 ENCOUNTER — HOSPITAL ENCOUNTER (OUTPATIENT)
Age: 74
Discharge: HOME | End: 2024-05-15
Payer: MEDICARE

## 2024-05-15 LAB — URATE SERPL-MCNC: 6 MG/DL (ref 2.4–5.7)

## 2024-05-15 PROCEDURE — 84550 ASSAY OF BLOOD/URIC ACID: CPT

## 2024-05-15 PROCEDURE — 36415 COLL VENOUS BLD VENIPUNCTURE: CPT

## 2024-05-28 ENCOUNTER — HOSPITAL ENCOUNTER (OUTPATIENT)
Age: 74
Discharge: HOME | End: 2024-05-30
Payer: MEDICARE

## 2024-05-28 ENCOUNTER — HOSPITAL ENCOUNTER (OUTPATIENT)
Dept: GENERAL RADIOLOGY | Age: 74
Discharge: HOME | End: 2024-05-30
Payer: MEDICARE

## 2024-05-28 DIAGNOSIS — R52 PAIN: ICD-10-CM

## 2024-05-28 PROCEDURE — 73630 X-RAY EXAM OF FOOT: CPT

## 2024-06-03 ENCOUNTER — HOSPITAL ENCOUNTER (OUTPATIENT)
Dept: WOMENS IMAGING | Age: 74
Discharge: HOME | End: 2024-06-05
Payer: MEDICARE

## 2024-06-03 VITALS — WEIGHT: 150 LBS | BODY MASS INDEX: 25.61 KG/M2 | HEIGHT: 64 IN

## 2024-06-03 DIAGNOSIS — Z12.39 SCREENING BREAST EXAMINATION: ICD-10-CM

## 2024-06-03 PROCEDURE — 77063 BREAST TOMOSYNTHESIS BI: CPT

## 2024-06-04 ENCOUNTER — HOSPITAL ENCOUNTER (OUTPATIENT)
Age: 74
Discharge: HOME | End: 2024-06-04
Payer: MEDICARE

## 2024-06-04 DIAGNOSIS — R31.9 HEMATURIA, UNSPECIFIED TYPE: ICD-10-CM

## 2024-06-04 DIAGNOSIS — R30.0 DYSURIA: ICD-10-CM

## 2024-06-04 LAB
BACTERIA URNS QL MICRO: ABNORMAL
BILIRUB UR QL STRIP: NEGATIVE
CHARACTER UR: ABNORMAL
CLARITY UR: ABNORMAL
COLOR UR: YELLOW
EPI CELLS #/AREA URNS HPF: ABNORMAL /HPF (ref 0–25)
GLUCOSE UR STRIP-MCNC: NEGATIVE MG/DL
HGB UR QL STRIP.AUTO: ABNORMAL
KETONES UR STRIP-MCNC: ABNORMAL MG/DL
LEUKOCYTE ESTERASE UR QL STRIP: ABNORMAL
NITRITE UR QL STRIP: POSITIVE
PH UR STRIP: 7 [PH] (ref 5–9)
PROT UR STRIP-MCNC: ABNORMAL MG/DL
RBC #/AREA URNS HPF: ABNORMAL /HPF (ref 0–2)
SP GR UR STRIP: 1.02 (ref 1.01–1.02)
UROBILINOGEN UR STRIP-ACNC: NORMAL EU/DL (ref 0–1)
WBC #/AREA URNS HPF: ABNORMAL /HPF (ref 0–5)

## 2024-06-04 PROCEDURE — 87088 URINE BACTERIA CULTURE: CPT

## 2024-06-04 PROCEDURE — 87086 URINE CULTURE/COLONY COUNT: CPT

## 2024-06-04 PROCEDURE — 81001 URINALYSIS AUTO W/SCOPE: CPT

## 2024-06-04 PROCEDURE — 87186 SC STD MICRODIL/AGAR DIL: CPT

## 2024-06-06 LAB
MICROORGANISM SPEC CULT: ABNORMAL
SERVICE CMNT-IMP: ABNORMAL
SPECIMEN DESCRIPTION: ABNORMAL

## 2024-10-16 PROBLEM — M75.41 IMPINGEMENT SYNDROME OF RIGHT SHOULDER: Status: RESOLVED | Noted: 2023-11-08 | Resolved: 2024-10-16

## 2025-03-12 ENCOUNTER — HOSPITAL ENCOUNTER (OUTPATIENT)
Age: 75
Discharge: HOME | End: 2025-03-12
Payer: MEDICARE

## 2025-03-12 DIAGNOSIS — I10 ESSENTIAL HYPERTENSION: ICD-10-CM

## 2025-03-12 DIAGNOSIS — E11.9 TYPE 2 DIABETES MELLITUS WITHOUT COMPLICATION, WITHOUT LONG-TERM CURRENT USE OF INSULIN (HCC): ICD-10-CM

## 2025-03-12 DIAGNOSIS — E78.2 MIXED HYPERLIPIDEMIA: ICD-10-CM

## 2025-03-12 LAB
ALBUMIN SERPL-MCNC: 4.2 G/DL (ref 3.5–5.2)
ALBUMIN/GLOB SERPL: 1.7 {RATIO} (ref 1–2.5)
ALP SERPL-CCNC: 56 U/L (ref 35–104)
ALT SERPL-CCNC: 15 U/L (ref 10–35)
ANION GAP SERPL CALCULATED.3IONS-SCNC: 13 MMOL/L (ref 9–16)
AST SERPL-CCNC: 21 U/L (ref 10–35)
BILIRUB SERPL-MCNC: 0.5 MG/DL (ref 0–1.2)
BUN SERPL-MCNC: 21 MG/DL (ref 8–23)
BUN/CREAT SERPL: 26 (ref 9–20)
CALCIUM SERPL-MCNC: 9.2 MG/DL (ref 8.6–10.4)
CHLORIDE SERPL-SCNC: 101 MMOL/L (ref 98–107)
CHOLEST SERPL-MCNC: 194 MG/DL (ref 0–199)
CHOLESTEROL/HDL RATIO: 2.6
CO2 SERPL-SCNC: 27 MMOL/L (ref 20–31)
CREAT SERPL-MCNC: 0.8 MG/DL (ref 0.5–0.9)
CREAT UR-MCNC: 127 MG/DL (ref 28–217)
EST. AVERAGE GLUCOSE BLD GHB EST-MCNC: 154 MG/DL
GFR, ESTIMATED: 79 ML/MIN/1.73M2
GLUCOSE P FAST SERPL-MCNC: 128 MG/DL (ref 74–99)
HBA1C MFR BLD: 7 % (ref 4–6)
HDLC SERPL-MCNC: 74 MG/DL
LDLC SERPL CALC-MCNC: 96 MG/DL (ref 0–100)
MICROALBUMIN UR-MCNC: <12 MG/L (ref 0–20)
MICROALBUMIN/CREAT UR-RTO: NORMAL MCG/MG CREAT (ref 0–25)
POTASSIUM SERPL-SCNC: 3.8 MMOL/L (ref 3.7–5.3)
PROT SERPL-MCNC: 6.7 G/DL (ref 6.6–8.7)
SODIUM SERPL-SCNC: 141 MMOL/L (ref 136–145)
TRIGL SERPL-MCNC: 120 MG/DL (ref 0–149)
VLDLC SERPL CALC-MCNC: 24 MG/DL (ref 1–30)

## 2025-03-12 PROCEDURE — 82570 ASSAY OF URINE CREATININE: CPT

## 2025-03-12 PROCEDURE — 36415 COLL VENOUS BLD VENIPUNCTURE: CPT

## 2025-03-12 PROCEDURE — 82043 UR ALBUMIN QUANTITATIVE: CPT

## 2025-03-12 PROCEDURE — 80053 COMPREHEN METABOLIC PANEL: CPT

## 2025-03-12 PROCEDURE — 80061 LIPID PANEL: CPT

## 2025-03-12 PROCEDURE — 83036 HEMOGLOBIN GLYCOSYLATED A1C: CPT

## 2025-07-01 ENCOUNTER — HOSPITAL ENCOUNTER (OUTPATIENT)
Dept: WOMENS IMAGING | Age: 75
Discharge: HOME | End: 2025-07-03
Payer: MEDICARE

## 2025-07-01 VITALS — BODY MASS INDEX: 26.29 KG/M2 | WEIGHT: 154 LBS | HEIGHT: 64 IN

## 2025-07-01 DIAGNOSIS — Z12.31 BREAST CANCER SCREENING BY MAMMOGRAM: ICD-10-CM

## 2025-07-01 PROCEDURE — 77063 BREAST TOMOSYNTHESIS BI: CPT

## (undated) DEVICE — SOLUTION IV IRRIG POUR BRL 0.9% SODIUM CHL 2F7124

## (undated) DEVICE — MEDI-VAC NON-CONDUCTIVE TUBING7MM X 30.5 (100FT): Brand: CARDINAL HEALTH

## (undated) DEVICE — CANNULA ORAL NSL AD CO2 N INTUB O2 DEL DISP TRU LNK